# Patient Record
Sex: FEMALE | Race: WHITE | Employment: OTHER | URBAN - NONMETROPOLITAN AREA
[De-identification: names, ages, dates, MRNs, and addresses within clinical notes are randomized per-mention and may not be internally consistent; named-entity substitution may affect disease eponyms.]

---

## 2017-01-20 ENCOUNTER — OFFICE VISIT (OUTPATIENT)
Dept: NEUROSURGERY | Age: 82
End: 2017-01-20
Payer: MEDICARE

## 2017-01-20 VITALS
HEART RATE: 93 BPM | WEIGHT: 135.4 LBS | BODY MASS INDEX: 22.56 KG/M2 | DIASTOLIC BLOOD PRESSURE: 70 MMHG | SYSTOLIC BLOOD PRESSURE: 120 MMHG | HEIGHT: 65 IN

## 2017-01-20 DIAGNOSIS — I67.1 ANEURYSM OF CAVERNOUS PORTION OF RIGHT INTERNAL CAROTID ARTERY: ICD-10-CM

## 2017-01-20 DIAGNOSIS — M54.42 CHRONIC LOW BACK PAIN WITH BILATERAL SCIATICA, UNSPECIFIED BACK PAIN LATERALITY: Primary | ICD-10-CM

## 2017-01-20 DIAGNOSIS — G89.29 CHRONIC LOW BACK PAIN WITH BILATERAL SCIATICA, UNSPECIFIED BACK PAIN LATERALITY: Primary | ICD-10-CM

## 2017-01-20 DIAGNOSIS — M54.41 CHRONIC LOW BACK PAIN WITH BILATERAL SCIATICA, UNSPECIFIED BACK PAIN LATERALITY: Primary | ICD-10-CM

## 2017-01-20 DIAGNOSIS — G50.0 TRIGEMINAL NEURALGIA: ICD-10-CM

## 2017-01-20 PROCEDURE — G8484 FLU IMMUNIZE NO ADMIN: HCPCS | Performed by: PSYCHIATRY & NEUROLOGY

## 2017-01-20 PROCEDURE — G8427 DOCREV CUR MEDS BY ELIG CLIN: HCPCS | Performed by: PSYCHIATRY & NEUROLOGY

## 2017-01-20 PROCEDURE — 4040F PNEUMOC VAC/ADMIN/RCVD: CPT | Performed by: PSYCHIATRY & NEUROLOGY

## 2017-01-20 PROCEDURE — 4004F PT TOBACCO SCREEN RCVD TLK: CPT | Performed by: PSYCHIATRY & NEUROLOGY

## 2017-01-20 PROCEDURE — 1123F ACP DISCUSS/DSCN MKR DOCD: CPT | Performed by: PSYCHIATRY & NEUROLOGY

## 2017-01-20 PROCEDURE — G8400 PT W/DXA NO RESULTS DOC: HCPCS | Performed by: PSYCHIATRY & NEUROLOGY

## 2017-01-20 PROCEDURE — 1090F PRES/ABSN URINE INCON ASSESS: CPT | Performed by: PSYCHIATRY & NEUROLOGY

## 2017-01-20 PROCEDURE — G8419 CALC BMI OUT NRM PARAM NOF/U: HCPCS | Performed by: PSYCHIATRY & NEUROLOGY

## 2017-01-20 PROCEDURE — 99214 OFFICE O/P EST MOD 30 MIN: CPT | Performed by: PSYCHIATRY & NEUROLOGY

## 2017-01-20 RX ORDER — HYDROCODONE BITARTRATE AND ACETAMINOPHEN 7.5; 325 MG/1; MG/1
1 TABLET ORAL EVERY 8 HOURS PRN
Qty: 60 TABLET | Refills: 0 | Status: SHIPPED | OUTPATIENT
Start: 2017-01-20 | End: 2017-07-21 | Stop reason: SDUPTHER

## 2017-01-30 ENCOUNTER — OFFICE VISIT (OUTPATIENT)
Dept: NEUROSURGERY | Facility: CLINIC | Age: 82
End: 2017-01-30

## 2017-01-30 VITALS
BODY MASS INDEX: 22.49 KG/M2 | HEIGHT: 65 IN | DIASTOLIC BLOOD PRESSURE: 60 MMHG | SYSTOLIC BLOOD PRESSURE: 100 MMHG | WEIGHT: 135 LBS

## 2017-01-30 DIAGNOSIS — F17.200 SMOKER: ICD-10-CM

## 2017-01-30 DIAGNOSIS — M47.892 OTHER SPONDYLOSIS, CERVICAL REGION: Primary | ICD-10-CM

## 2017-01-30 DIAGNOSIS — M50.30 DEGENERATION OF CERVICAL INTERVERTEBRAL DISC: ICD-10-CM

## 2017-01-30 PROCEDURE — 99213 OFFICE O/P EST LOW 20 MIN: CPT | Performed by: NEUROLOGICAL SURGERY

## 2017-01-30 RX ORDER — INDAPAMIDE 2.5 MG/1
TABLET, FILM COATED ORAL
COMMUNITY
End: 2019-01-01

## 2017-01-30 RX ORDER — ALBUTEROL SULFATE 90 UG/1
AEROSOL, METERED RESPIRATORY (INHALATION) EVERY 6 HOURS
COMMUNITY

## 2017-01-30 RX ORDER — HYDROCODONE BITARTRATE AND ACETAMINOPHEN 7.5; 325 MG/1; MG/1
TABLET ORAL EVERY 8 HOURS
COMMUNITY
Start: 2017-01-20 | End: 2018-08-24

## 2017-01-30 RX ORDER — ATORVASTATIN CALCIUM 40 MG/1
TABLET, FILM COATED ORAL
COMMUNITY

## 2017-01-30 RX ORDER — CYCLOBENZAPRINE HCL 10 MG
TABLET ORAL 3 TIMES DAILY
COMMUNITY
Start: 2016-08-15 | End: 2018-08-24

## 2017-01-30 RX ORDER — SENNOSIDES 8.6 MG
TABLET ORAL NIGHTLY
COMMUNITY
End: 2018-08-24

## 2017-01-30 RX ORDER — IPRATROPIUM BROMIDE AND ALBUTEROL SULFATE 2.5; .5 MG/3ML; MG/3ML
SOLUTION RESPIRATORY (INHALATION)
COMMUNITY
End: 2019-01-01

## 2017-01-30 NOTE — MR AVS SNAPSHOT
Shirley Bamforth   1/30/2017 12:45 PM   Office Visit    Dept Phone:  [phone]   Encounter #:  [id number]    Provider:  Romain Smyth MD   Department:  [facility] NEUROSURGERY                Your Full Care Plan              Your Updated Medication List          This list is accurate as of: 1/30/17  1:16 PM.  Always use your most recent med list.                * albuterol 108 (90 BASE) MCG/ACT inhaler   Commonly known as:  PROVENTIL HFA;VENTOLIN HFA       * albuterol 108 (90 BASE) MCG/ACT inhaler   Commonly known as:  PROVENTIL HFA;VENTOLIN HFA       * atorvastatin 40 MG tablet   Commonly known as:  LIPITOR       * atorvastatin 40 MG tablet   Commonly known as:  LIPITOR       * cyclobenzaprine 10 MG tablet   Commonly known as:  FLEXERIL       * cyclobenzaprine 10 MG tablet   Commonly known as:  FLEXERIL       HYDROcodone-acetaminophen 7.5-325 MG per tablet   Commonly known as:  NORCO       * indapamide 2.5 MG tablet   Commonly known as:  LOZOL       * indapamide 2.5 MG tablet   Commonly known as:  LOZOL       * ipratropium-albuterol 0.5-2.5 mg/mL nebulizer   Commonly known as:  DUO-NEB       * ipratropium-albuterol 0.5-2.5 mg/mL nebulizer   Commonly known as:  DUO-NEB       LINZESS 145 MCG capsule   Generic drug:  Linaclotide       O2   Commonly known as:  OXYGEN       potassium chloride 10 MEQ CR tablet   Commonly known as:  K-DUR       POTASSIUM PO       * salmeterol 50 MCG/DOSE diskus inhaler   Commonly known as:  SEREVENT       * SEREVENT DISKUS 50 MCG/DOSE diskus inhaler   Generic drug:  salmeterol       senna 8.6 MG tablet tablet   Commonly known as:  SENOKOT       SENNA CO       * Notice:  This list has 12 medication(s) that are the same as other medications prescribed for you. Read the directions carefully, and ask your doctor or other care provider to review them with you.            You Were Diagnosed With        Codes Comments    Other  "spondylosis, cervical region    -  Primary ICD-10-CM: M47.892  ICD-9-CM: 721.0     Degeneration of cervical intervertebral disc     ICD-10-CM: M50.30  ICD-9-CM: 722.4     Smoker     ICD-10-CM: F17.200  ICD-9-CM: 305.1       Instructions     None    Patient Instructions History      Upcoming Appointments     Visit Type Date Time Department    FOLLOW UP 1/30/2017 12:45 PM [facility]      MyChart Signup     Our records indicate that you have declined [facility] MyChart signup. If you would like to sign up for [employer], please email [email or web address] or call [phone] to obtain an activation code.             Other Info from Your Visit           Allergies     Nsaids      Oxycontin [Oxycodone Hcl]      Penicillins      Sulfa Antibiotics      Adhesive Tape  Rash    Baclofen  Rash, Confusion    Soma [Carisoprodol]  Rash      Reason for Visit     Neck Pain Better      Vital Signs     Blood Pressure Height Weight Body Mass Index Smoking Status       100/60 65\" (165.1 cm) 135 lb (61.2 kg) 22.47 kg/m2 Current Every Day Smoker       Problems and Diagnoses Noted     Degeneration of intervertebral disc of cervical region    Arthritis of spine    Smoker            "

## 2017-01-30 NOTE — LETTER
January 30, 2017     TERRANCE Marley  [address] [address]    Patient: Shirley Bamforth   YOB: 1933   Date of Visit: 1/30/2017       Dear TERRANCE Amaya:    Shirley Bamforth was in my office today. Below is a copy of my note.    If you have questions, please do not hesitate to call me. I look forward to following Marii along with you.         Sincerely,        Romain Smyth MD        CC: No Recipients    Patient ID: Shirley Bamforth is a 83 y.o. female here today for [] follow-up for neck pain    HPI:  [] Her neck pain has improved.  The pain had radiated up to the suboccipital area.  No radicular pain into the extremities.  Her children gave her a vibrating device which helped her neck pain.    Leslee had an MRI at [facility] of the cervical spine.  She has disc bulging worse at C3 4 and C45.  She also has had a remote injury to her neck and an accident.  She does not complain of specific weakness or numbness.    The following portions of the patient's history were reviewed and updated as appropriate: allergies, current medications, past family history, past medical history, past social history, past surgical history and problem list.    Review of Systems   Constitutional: Negative.    HENT: Positive for hearing loss.    Eyes: Negative.    Respiratory: Positive for wheezing.    Cardiovascular: Negative.    Gastrointestinal: Positive for abdominal pain and diarrhea.   Endocrine: Negative.    Genitourinary: Negative.         Dribbling   Musculoskeletal: Positive for neck pain.   Skin: Negative.    Allergic/Immunologic: Negative.    Neurological: Negative.    Hematological: Negative.    Psychiatric/Behavioral: Negative.    All other systems reviewed and are negative.      Past Medical History   Diagnosis Date   • Asthma    • Brain aneurysm      Does have endovascular coiling this is MRI compatible   • Cancer, uterine    • COPD (chronic obstructive pulmonary disease)     • Diverticulitis    • Dyspnea    • GERD (gastroesophageal reflux disease)    • Osteoporosis    • Rheumatoid arthritis    • Shingles (herpes zoster) polyneuropathy        Family History   Problem Relation Age of Onset   • Arthritis Mother    • Cancer Mother    • Diabetes Mother    • Hypertension Mother    • Diabetes Brother    • Heart disease Brother    • Hypertension Brother    • Cancer Daughter        Social History   Substance Use Topics   • Smoking status: Current Every Day Smoker     Packs/day: 1.00     Types: Cigarettes   • Smokeless tobacco: Never Used      Comment: Trying to cut down   • Alcohol use No       Past Surgical History   Procedure Laterality Date   • Hysterectomy     • Appendectomy     • Cataract extraction extracapsular w/ intraocular lens implantation Bilateral    • Hernia repair     • Cerebral aneurysm repair     • Colostomy     • Tonsillectomy and adenoidectomy     • Shoulder surgery Right        Nsaids; Oxycontin [oxycodone hcl]; Penicillins; Sulfa antibiotics; Adhesive tape; Baclofen; and Soma [carisoprodol]      Physical Exam:  [] Chest clear heart slightly irregular no murmur heard.  Tenderness about the neck.  Fairly good range of motion especially for her age.  Good equal strength upper and lower extremities.  Diminished reflexes throughout.  No sensory loss.  No Juan Alberto's no clonus    Review of Radiographic Studies:  [] MRI cervical spine reviewed.  Disc degeneration and some relative stenosis.  Chronic changes of the spinal cord as well.    Medical Decision Making:  [] Her symptoms of neck pain are improved.  She does not require any surgical intervention at her age especially    1. Other spondylosis, cervical region    2. Degeneration of cervical intervertebral disc    3. Smoker         Return if symptoms worsen or fail to improve.

## 2017-01-30 NOTE — PROGRESS NOTES
Patient ID: Shirley Bamforth is a 83 y.o. female here today for [] follow-up for neck pain    HPI:  [] Her neck pain has improved.  The pain had radiated up to the suboccipital area.  No radicular pain into the extremities.  Her children gave her a vibrating device which helped her neck pain.    Leslee had an MRI at [facility] of the cervical spine.  She has disc bulging worse at C3 4 and C45.  She also has had a remote injury to her neck and an accident.  She does not complain of specific weakness or numbness.    The following portions of the patient's history were reviewed and updated as appropriate: allergies, current medications, past family history, past medical history, past social history, past surgical history and problem list.    Review of Systems   Constitutional: Negative.    HENT: Positive for hearing loss.    Eyes: Negative.    Respiratory: Positive for wheezing.    Cardiovascular: Negative.    Gastrointestinal: Positive for abdominal pain and diarrhea.   Endocrine: Negative.    Genitourinary: Negative.         Dribbling   Musculoskeletal: Positive for neck pain.   Skin: Negative.    Allergic/Immunologic: Negative.    Neurological: Negative.    Hematological: Negative.    Psychiatric/Behavioral: Negative.    All other systems reviewed and are negative.      Past Medical History   Diagnosis Date   • Asthma    • Brain aneurysm      Does have endovascular coiling this is MRI compatible   • Cancer, uterine    • COPD (chronic obstructive pulmonary disease)    • Diverticulitis    • Dyspnea    • GERD (gastroesophageal reflux disease)    • Osteoporosis    • Rheumatoid arthritis    • Shingles (herpes zoster) polyneuropathy        Family History   Problem Relation Age of Onset   • Arthritis Mother    • Cancer Mother    • Diabetes Mother    • Hypertension Mother    • Diabetes Brother    • Heart disease Brother    • Hypertension Brother    • Cancer Daughter        Social History   Substance Use Topics   • Smoking  status: Current Every Day Smoker     Packs/day: 1.00     Types: Cigarettes   • Smokeless tobacco: Never Used      Comment: Trying to cut down   • Alcohol use No       Past Surgical History   Procedure Laterality Date   • Hysterectomy     • Appendectomy     • Cataract extraction extracapsular w/ intraocular lens implantation Bilateral    • Hernia repair     • Cerebral aneurysm repair     • Colostomy     • Tonsillectomy and adenoidectomy     • Shoulder surgery Right        Nsaids; Oxycontin [oxycodone hcl]; Penicillins; Sulfa antibiotics; Adhesive tape; Baclofen; and Soma [carisoprodol]      Physical Exam:  [] Chest clear heart slightly irregular no murmur heard.  Tenderness about the neck.  Fairly good range of motion especially for her age.  Good equal strength upper and lower extremities.  Diminished reflexes throughout.  No sensory loss.  No Juan Alberto's no clonus    Review of Radiographic Studies:  [] MRI cervical spine reviewed.  Disc degeneration and some relative stenosis.  Chronic changes of the spinal cord as well.    Medical Decision Making:  [] Her symptoms of neck pain are improved.  She does not require any surgical intervention at her age especially    1. Other spondylosis, cervical region    2. Degeneration of cervical intervertebral disc    3. Smoker         Return if symptoms worsen or fail to improve.

## 2017-06-29 ENCOUNTER — TELEPHONE (OUTPATIENT)
Dept: NEUROSURGERY | Facility: CLINIC | Age: 82
End: 2017-06-29

## 2017-06-29 ENCOUNTER — TELEPHONE (OUTPATIENT)
Dept: NEUROLOGY | Age: 82
End: 2017-06-29

## 2017-06-29 DIAGNOSIS — M47.22 OSTEOARTHRITIS OF SPINE WITH RADICULOPATHY, CERVICAL REGION: Primary | ICD-10-CM

## 2017-06-29 DIAGNOSIS — M50.30 DEGENERATION OF CERVICAL INTERVERTEBRAL DISC: ICD-10-CM

## 2017-06-29 NOTE — TELEPHONE ENCOUNTER
Patient calls requesting referral to pain management. She complains that her neck pain has worsened over the past few weeks. I have explained that due to not seeing her recently they may not accept any referrals without recent exam, but I would try to refer. She understands if no one will accept our referral that she needs to see her PCP for referral. She is agreeable.

## 2017-07-21 ENCOUNTER — OFFICE VISIT (OUTPATIENT)
Dept: NEUROSURGERY | Age: 82
End: 2017-07-21
Payer: MEDICARE

## 2017-07-21 VITALS
HEART RATE: 80 BPM | HEIGHT: 65 IN | BODY MASS INDEX: 21.99 KG/M2 | WEIGHT: 132 LBS | DIASTOLIC BLOOD PRESSURE: 76 MMHG | SYSTOLIC BLOOD PRESSURE: 131 MMHG | OXYGEN SATURATION: 95 %

## 2017-07-21 DIAGNOSIS — M54.42 CHRONIC LOW BACK PAIN WITH BILATERAL SCIATICA, UNSPECIFIED BACK PAIN LATERALITY: Primary | ICD-10-CM

## 2017-07-21 DIAGNOSIS — G89.29 CHRONIC LOW BACK PAIN WITH BILATERAL SCIATICA, UNSPECIFIED BACK PAIN LATERALITY: Primary | ICD-10-CM

## 2017-07-21 DIAGNOSIS — I67.1 ANEURYSM OF CAVERNOUS PORTION OF RIGHT INTERNAL CAROTID ARTERY: ICD-10-CM

## 2017-07-21 DIAGNOSIS — M54.41 CHRONIC LOW BACK PAIN WITH BILATERAL SCIATICA, UNSPECIFIED BACK PAIN LATERALITY: Primary | ICD-10-CM

## 2017-07-21 DIAGNOSIS — G50.0 TRIGEMINAL NEURALGIA: ICD-10-CM

## 2017-07-21 PROCEDURE — 4040F PNEUMOC VAC/ADMIN/RCVD: CPT | Performed by: PSYCHIATRY & NEUROLOGY

## 2017-07-21 PROCEDURE — G8420 CALC BMI NORM PARAMETERS: HCPCS | Performed by: PSYCHIATRY & NEUROLOGY

## 2017-07-21 PROCEDURE — G8427 DOCREV CUR MEDS BY ELIG CLIN: HCPCS | Performed by: PSYCHIATRY & NEUROLOGY

## 2017-07-21 PROCEDURE — 99214 OFFICE O/P EST MOD 30 MIN: CPT | Performed by: PSYCHIATRY & NEUROLOGY

## 2017-07-21 PROCEDURE — 4004F PT TOBACCO SCREEN RCVD TLK: CPT | Performed by: PSYCHIATRY & NEUROLOGY

## 2017-07-21 PROCEDURE — G8400 PT W/DXA NO RESULTS DOC: HCPCS | Performed by: PSYCHIATRY & NEUROLOGY

## 2017-07-21 PROCEDURE — 1123F ACP DISCUSS/DSCN MKR DOCD: CPT | Performed by: PSYCHIATRY & NEUROLOGY

## 2017-07-21 PROCEDURE — 1090F PRES/ABSN URINE INCON ASSESS: CPT | Performed by: PSYCHIATRY & NEUROLOGY

## 2017-07-21 RX ORDER — MELOXICAM 15 MG/1
15 TABLET ORAL DAILY
COMMUNITY
End: 2017-09-27

## 2017-07-21 RX ORDER — HYDROCODONE BITARTRATE AND ACETAMINOPHEN 7.5; 325 MG/1; MG/1
1 TABLET ORAL EVERY 8 HOURS PRN
Qty: 60 TABLET | Refills: 0 | Status: SHIPPED | OUTPATIENT
Start: 2017-07-21 | End: 2017-09-27 | Stop reason: SDUPTHER

## 2017-07-21 RX ORDER — CYCLOBENZAPRINE HCL 10 MG
10 TABLET ORAL 3 TIMES DAILY PRN
Qty: 60 TABLET | Refills: 11 | Status: SHIPPED | OUTPATIENT
Start: 2017-07-21 | End: 2018-07-24 | Stop reason: ALTCHOICE

## 2017-07-21 RX ORDER — FAMOTIDINE 20 MG/1
20 TABLET, FILM COATED ORAL 2 TIMES DAILY
COMMUNITY
End: 2017-11-13 | Stop reason: ALTCHOICE

## 2017-07-26 ENCOUNTER — HOSPITAL ENCOUNTER (OUTPATIENT)
Dept: GENERAL RADIOLOGY | Age: 82
Discharge: HOME | End: 2017-07-26
Payer: MEDICARE

## 2017-07-26 ENCOUNTER — HOSPITAL ENCOUNTER (OUTPATIENT)
Dept: PAIN MANAGEMENT | Age: 82
Discharge: HOME | End: 2017-07-26
Payer: MEDICARE

## 2017-07-26 VITALS
OXYGEN SATURATION: 96 % | SYSTOLIC BLOOD PRESSURE: 109 MMHG | BODY MASS INDEX: 21.38 KG/M2 | RESPIRATION RATE: 20 BRPM | TEMPERATURE: 97 F | HEART RATE: 73 BPM | HEIGHT: 66 IN | WEIGHT: 133 LBS | DIASTOLIC BLOOD PRESSURE: 67 MMHG

## 2017-07-26 DIAGNOSIS — M54.2 CERVICAL PAIN (NECK): Primary | ICD-10-CM

## 2017-07-26 DIAGNOSIS — M54.2 CERVICAL PAIN (NECK): ICD-10-CM

## 2017-07-26 PROCEDURE — 99214 OFFICE O/P EST MOD 30 MIN: CPT

## 2017-07-26 PROCEDURE — 72040 X-RAY EXAM NECK SPINE 2-3 VW: CPT

## 2017-07-26 ASSESSMENT — PAIN SCALES - GENERAL: PAINLEVEL_OUTOF10: 4

## 2017-07-26 ASSESSMENT — PAIN DESCRIPTION - PROGRESSION: CLINICAL_PROGRESSION: GRADUALLY WORSENING

## 2017-07-26 ASSESSMENT — PAIN DESCRIPTION - PAIN TYPE: TYPE: CHRONIC PAIN

## 2017-07-26 ASSESSMENT — PAIN DESCRIPTION - DIRECTION: RADIATING_TOWARDS: RADIATES UP INTO HEAD

## 2017-07-26 ASSESSMENT — PAIN DESCRIPTION - ORIENTATION: ORIENTATION: LOWER

## 2017-07-26 ASSESSMENT — ACTIVITIES OF DAILY LIVING (ADL): EFFECT OF PAIN ON DAILY ACTIVITIES: LIMITS ACTIVITIES

## 2017-07-26 ASSESSMENT — PAIN DESCRIPTION - FREQUENCY: FREQUENCY: INTERMITTENT

## 2017-07-26 ASSESSMENT — PAIN DESCRIPTION - LOCATION: LOCATION: NECK;HEAD

## 2017-07-26 ASSESSMENT — PAIN DESCRIPTION - ONSET: ONSET: ON-GOING

## 2017-07-26 ASSESSMENT — PAIN DESCRIPTION - DESCRIPTORS: DESCRIPTORS: CONSTANT;SHARP;ACHING

## 2017-08-23 ENCOUNTER — HOSPITAL ENCOUNTER (OUTPATIENT)
Dept: PAIN MANAGEMENT | Age: 82
Discharge: HOME | End: 2017-08-23
Payer: MEDICARE

## 2017-08-23 VITALS
SYSTOLIC BLOOD PRESSURE: 147 MMHG | OXYGEN SATURATION: 96 % | BODY MASS INDEX: 21.38 KG/M2 | HEIGHT: 66 IN | WEIGHT: 133 LBS | DIASTOLIC BLOOD PRESSURE: 65 MMHG | RESPIRATION RATE: 20 BRPM | TEMPERATURE: 97.4 F | HEART RATE: 80 BPM

## 2017-08-23 PROCEDURE — 99214 OFFICE O/P EST MOD 30 MIN: CPT

## 2017-08-23 RX ORDER — PREGABALIN 75 MG/1
75 CAPSULE ORAL DAILY
Qty: 60 CAPSULE | Refills: 2 | Status: SHIPPED | OUTPATIENT
Start: 2017-08-23 | End: 2017-09-27

## 2017-08-23 ASSESSMENT — PAIN DESCRIPTION - LOCATION: LOCATION: NECK

## 2017-08-23 ASSESSMENT — PAIN DESCRIPTION - ONSET: ONSET: ON-GOING

## 2017-08-23 ASSESSMENT — ACTIVITIES OF DAILY LIVING (ADL): EFFECT OF PAIN ON DAILY ACTIVITIES: DAILY CHORES AND ACTIVITIES

## 2017-08-23 ASSESSMENT — PAIN DESCRIPTION - FREQUENCY: FREQUENCY: INTERMITTENT

## 2017-08-23 ASSESSMENT — PAIN SCALES - GENERAL: PAINLEVEL_OUTOF10: 4

## 2017-08-23 ASSESSMENT — PAIN DESCRIPTION - DESCRIPTORS: DESCRIPTORS: ACHING;THROBBING

## 2017-08-23 ASSESSMENT — PAIN DESCRIPTION - PAIN TYPE: TYPE: CHRONIC PAIN

## 2017-08-23 ASSESSMENT — PAIN DESCRIPTION - PROGRESSION: CLINICAL_PROGRESSION: GRADUALLY WORSENING

## 2017-09-27 ENCOUNTER — HOSPITAL ENCOUNTER (OUTPATIENT)
Dept: PAIN MANAGEMENT | Age: 82
Discharge: HOME | End: 2017-09-27
Payer: MEDICARE

## 2017-09-27 VITALS
DIASTOLIC BLOOD PRESSURE: 50 MMHG | OXYGEN SATURATION: 98 % | RESPIRATION RATE: 20 BRPM | WEIGHT: 129 LBS | BODY MASS INDEX: 20.73 KG/M2 | TEMPERATURE: 97.4 F | HEART RATE: 73 BPM | HEIGHT: 66 IN | SYSTOLIC BLOOD PRESSURE: 118 MMHG

## 2017-09-27 DIAGNOSIS — M54.2 NECK PAIN: ICD-10-CM

## 2017-09-27 DIAGNOSIS — M50.10 CERVICAL DISC DISORDER WITH RADICULOPATHY: Chronic | ICD-10-CM

## 2017-09-27 PROCEDURE — 62321 NJX INTERLAMINAR CRV/THRC: CPT

## 2017-09-27 PROCEDURE — 2580000003 HC RX 258

## 2017-09-27 PROCEDURE — 3209999900 FLUORO FOR SURGICAL PROCEDURES

## 2017-09-27 PROCEDURE — 6360000002 HC RX W HCPCS

## 2017-09-27 PROCEDURE — 2500000003 HC RX 250 WO HCPCS

## 2017-09-27 RX ORDER — BUPIVACAINE HYDROCHLORIDE 2.5 MG/ML
INJECTION, SOLUTION EPIDURAL; INFILTRATION; INTRACAUDAL
Status: COMPLETED | OUTPATIENT
Start: 2017-09-27 | End: 2017-09-27

## 2017-09-27 RX ORDER — 0.9 % SODIUM CHLORIDE 0.9 %
VIAL (ML) INJECTION
Status: COMPLETED | OUTPATIENT
Start: 2017-09-27 | End: 2017-09-27

## 2017-09-27 RX ORDER — HYDROCODONE BITARTRATE AND ACETAMINOPHEN 7.5; 325 MG/1; MG/1
1 TABLET ORAL EVERY 8 HOURS PRN
Qty: 60 TABLET | Refills: 0 | Status: SHIPPED | OUTPATIENT
Start: 2017-09-27 | End: 2018-05-03 | Stop reason: SDUPTHER

## 2017-09-27 RX ORDER — LIDOCAINE HYDROCHLORIDE 10 MG/ML
INJECTION, SOLUTION EPIDURAL; INFILTRATION; INTRACAUDAL; PERINEURAL
Status: COMPLETED | OUTPATIENT
Start: 2017-09-27 | End: 2017-09-27

## 2017-09-27 RX ADMIN — BUPIVACAINE HYDROCHLORIDE 2.5 ML: 2.5 INJECTION, SOLUTION EPIDURAL; INFILTRATION; INTRACAUDAL at 11:20

## 2017-09-27 RX ADMIN — Medication 1.5 ML: at 11:20

## 2017-09-27 RX ADMIN — LIDOCAINE HYDROCHLORIDE 3 ML: 10 INJECTION, SOLUTION EPIDURAL; INFILTRATION; INTRACAUDAL; PERINEURAL at 11:18

## 2017-09-27 ASSESSMENT — PAIN - FUNCTIONAL ASSESSMENT: PAIN_FUNCTIONAL_ASSESSMENT: 0-10

## 2017-09-27 ASSESSMENT — PAIN DESCRIPTION - DESCRIPTORS: DESCRIPTORS: ACHING;THROBBING;RADIATING

## 2017-09-27 ASSESSMENT — ACTIVITIES OF DAILY LIVING (ADL): EFFECT OF PAIN ON DAILY ACTIVITIES: DAILY CHORES AND ACTIVITIES

## 2017-11-13 ENCOUNTER — OFFICE VISIT (OUTPATIENT)
Dept: CARDIOLOGY | Age: 82
End: 2017-11-13
Payer: MEDICARE

## 2017-11-13 VITALS
SYSTOLIC BLOOD PRESSURE: 118 MMHG | BODY MASS INDEX: 21.83 KG/M2 | HEART RATE: 88 BPM | HEIGHT: 65 IN | WEIGHT: 131 LBS | DIASTOLIC BLOOD PRESSURE: 60 MMHG

## 2017-11-13 DIAGNOSIS — I49.9 IRREGULAR HEARTBEAT: ICD-10-CM

## 2017-11-13 DIAGNOSIS — R94.31 ABNORMAL EKG: ICD-10-CM

## 2017-11-13 DIAGNOSIS — R00.2 PALPITATIONS: ICD-10-CM

## 2017-11-13 DIAGNOSIS — Z72.0 TOBACCO ABUSE: ICD-10-CM

## 2017-11-13 DIAGNOSIS — I49.9 IRREGULAR HEART RATE: Primary | ICD-10-CM

## 2017-11-13 DIAGNOSIS — Z01.818 PRE-OP EVALUATION: ICD-10-CM

## 2017-11-13 DIAGNOSIS — E78.2 MIXED HYPERLIPIDEMIA: ICD-10-CM

## 2017-11-13 DIAGNOSIS — J43.8 OTHER EMPHYSEMA (HCC): ICD-10-CM

## 2017-11-13 LAB
ANION GAP SERPL CALCULATED.3IONS-SCNC: 14 MMOL/L (ref 7–19)
BUN BLDV-MCNC: 23 MG/DL (ref 8–23)
CALCIUM SERPL-MCNC: 9.3 MG/DL (ref 8.8–10.2)
CHLORIDE BLD-SCNC: 102 MMOL/L (ref 98–111)
CO2: 29 MMOL/L (ref 22–29)
CREAT SERPL-MCNC: 0.9 MG/DL (ref 0.5–0.9)
GFR NON-AFRICAN AMERICAN: 60
GLUCOSE BLD-MCNC: 96 MG/DL (ref 74–109)
POTASSIUM SERPL-SCNC: 3.5 MMOL/L (ref 3.5–5)
SODIUM BLD-SCNC: 145 MMOL/L (ref 136–145)
TSH SERPL DL<=0.05 MIU/L-ACNC: 1.51 UIU/ML (ref 0.27–4.2)

## 2017-11-13 PROCEDURE — G8484 FLU IMMUNIZE NO ADMIN: HCPCS | Performed by: CLINICAL NURSE SPECIALIST

## 2017-11-13 PROCEDURE — 99204 OFFICE O/P NEW MOD 45 MIN: CPT | Performed by: CLINICAL NURSE SPECIALIST

## 2017-11-13 PROCEDURE — G8926 SPIRO NO PERF OR DOC: HCPCS | Performed by: CLINICAL NURSE SPECIALIST

## 2017-11-13 PROCEDURE — G8427 DOCREV CUR MEDS BY ELIG CLIN: HCPCS | Performed by: CLINICAL NURSE SPECIALIST

## 2017-11-13 PROCEDURE — 93000 ELECTROCARDIOGRAM COMPLETE: CPT | Performed by: CLINICAL NURSE SPECIALIST

## 2017-11-13 PROCEDURE — 1090F PRES/ABSN URINE INCON ASSESS: CPT | Performed by: CLINICAL NURSE SPECIALIST

## 2017-11-13 PROCEDURE — G8400 PT W/DXA NO RESULTS DOC: HCPCS | Performed by: CLINICAL NURSE SPECIALIST

## 2017-11-13 PROCEDURE — 4040F PNEUMOC VAC/ADMIN/RCVD: CPT | Performed by: CLINICAL NURSE SPECIALIST

## 2017-11-13 PROCEDURE — 1123F ACP DISCUSS/DSCN MKR DOCD: CPT | Performed by: CLINICAL NURSE SPECIALIST

## 2017-11-13 PROCEDURE — G8420 CALC BMI NORM PARAMETERS: HCPCS | Performed by: CLINICAL NURSE SPECIALIST

## 2017-11-13 PROCEDURE — 4004F PT TOBACCO SCREEN RCVD TLK: CPT | Performed by: CLINICAL NURSE SPECIALIST

## 2017-11-13 PROCEDURE — 3023F SPIROM DOC REV: CPT | Performed by: CLINICAL NURSE SPECIALIST

## 2017-11-13 RX ORDER — OMEPRAZOLE 20 MG/1
20 CAPSULE, DELAYED RELEASE ORAL 2 TIMES DAILY
COMMUNITY
End: 2019-08-20 | Stop reason: ALTCHOICE

## 2017-11-13 NOTE — PROGRESS NOTES
bag    Hiatal hernia     Irregular heartbeat     Irritable bowel syndrome     Mixed hyperlipidemia 11/14/2017    Osteoporosis     Rheumatoid arthritis(714.0)     Scoliosis     Shingles     Spinal stenosis     Spondylisthesis     L5-6    Tobacco abuse 11/14/2017    Ulcer ([address]     Uterine cancer ([facility] [address]      Past Surgical History:   Procedure Laterality Date    ABDOMINAL ADHESION SURGERY      APPENDECTOMY      CHOLECYSTECTOMY      COLON SURGERY      colostomy    ELBOW SURGERY      RIGHT    HAND SURGERY      RIGHT    HERNIA REPAIR      HYSTERECTOMY      SHOULDER SURGERY      RIGHT    SKIN CANCER EXCISION  1962    TONSILLECTOMY AND ADENOIDECTOMY      TUBAL LIGATION      TYMPANOSTOMY TUBE PLACEMENT      LEFT     Family History   Problem Relation Age of Onset    Diabetes Mother     Heart Disease Mother     Cancer Mother     Other Mother     Heart Disease Brother      2    Stroke Brother     Diabetes Brother     Cancer Brother      Social History   Substance Use Topics    Smoking status: Current Every Day Smoker     Packs/day: 0.25     Years: 51.00     Types: Cigarettes    Smokeless tobacco: Never Used      Comment: down to 5 cigs a day    Alcohol use Not on file      Current Outpatient Prescriptions   Medication Sig Dispense Refill    omeprazole (PRILOSEC) 20 MG delayed release capsule Take 20 mg by mouth 2 times daily      HYDROcodone-acetaminophen (NORCO) 7.5-325 MG per tablet Take 1 tablet by mouth every 8 hours as needed for Pain .  Earliest Fill Date: 9/20/17 60 tablet 0    cyclobenzaprine (FLEXERIL) 10 MG tablet Take 1 tablet by mouth 3 times daily as needed for Muscle spasms 60 tablet 11    linaclotide (LINZESS) 145 MCG capsule Take 145 mcg by mouth every morning (before breakfast)      ipratropium-albuterol (DUONEB) 0.5-2.5 (3) MG/3ML SOLN nebulizer solution Inhale 1 vial into the lungs 4 times daily      Oxygen Concentrator Indications: uses 8-10 hrs nightly      albuterol (PROVENTIL HFA;VENTOLIN HFA) 108 (90 BASE) MCG/ACT inhaler Inhale 2 puffs into the lungs every 6 hours as needed.  potassium chloride (KLOR-CON) 10 MEQ CR tablet Take 10 mEq by mouth 2 times daily.  aspirin 325 MG EC tablet Take 325 mg by mouth daily.  salmeterol (SEREVENT DISKUS) 50 MCG/DOSE diskus inhaler Inhale 1 puff into the lungs 2 times daily.  indapamide (LOZOL) 2.5 MG tablet Take 2.5 mg by mouth every morning.  atorvastatin (LIPITOR) 40 MG tablet Take 40 mg by mouth daily. No current facility-administered medications for this visit. Allergies: Baclofen; Ibuprofen; Medrol [methylprednisolone]; Neosporin [neomycin-bacitracin zn-polymyx]; Nsaids; Other; Oxycontin [oxycodone hcl]; Pcn [penicillins]; Soma [carisoprodol]; Sulfa antibiotics; and Tape [adhesive tape]    Review of Systems  Review of Systems   Constitutional: Negative for chills, fever and malaise/fatigue. HENT: Negative for congestion. Eyes: Negative for blurred vision. Respiratory: Positive for shortness of breath (mild, chronic). Negative for cough. Cardiovascular: Positive for palpitations (daily). Negative for chest pain, orthopnea, leg swelling and PND. Gastrointestinal: Negative for blood in stool, heartburn, nausea and vomiting. Musculoskeletal: Positive for joint pain (left shoulder). Negative for falls and myalgias. Skin: Negative for rash. Neurological: Negative for dizziness, sensory change, speech change and focal weakness. Endo/Heme/Allergies: Does not bruise/bleed easily. Psychiatric/Behavioral: Negative for depression. The patient is not nervous/anxious. Objective  Vital Signs - /60   Pulse 88   Ht 5' 5\" (1.651 m)   Wt 131 lb (59.4 kg)   BMI 21.80 kg/m²   Physical Exam   Constitutional: She is oriented to person, place, and time. She appears well-developed and well-nourished. No distress. HENT:   Head: Normocephalic and atraumatic. Eyes: Pupils are equal, round, and reactive to light. Right eye exhibits no discharge. Left eye exhibits no discharge. Neck: No JVD present. No tracheal deviation present. Cardiovascular: Normal rate, normal heart sounds and intact distal pulses. Exam reveals no gallop and no friction rub. No murmur heard. No carotid bruit  Irregular heart rate   Pulmonary/Chest: Effort normal and breath sounds normal. No respiratory distress. She has no wheezes. She has no rales. Abdominal: Soft. There is no tenderness. Musculoskeletal: She exhibits edema (trace lower extremities). Limited mobility of left arm and shoulder   Neurological: She is alert and oriented to person, place, and time. No cranial nerve deficit. Skin: Skin is warm and dry. No rash noted. Psychiatric: She has a normal mood and affect. Her behavior is normal. Judgment normal.   Nursing note and vitals reviewed. Assessment:    1. Irregular heart rate  ECHO Complete 2D W Doppler W Color   2. Palpitations  ECHO Complete 2D W Doppler W Color    ECHO Pharmacological Stress Test    Holter Monitor 48 Hour    TSH without Reflex    Basic Metabolic Panel    Cardiac event monitor   3. Pre-op evaluation  ECHO Pharmacological Stress Test   4. Other emphysema ([address]     5. Tobacco abuse     6. Mixed hyperlipidemia       Patient is taking medications as prescribed  EKG Reviewed with Dr. Rik Quiñonez as well as EKG sent from PCP. Although the print out states atrial fibrillation, it is likely sinus rhythm with frequent PACs    Patient with an abnormal EKG and palpitations in need of a preoperative evaluation for an upcoming shoulder surgery. Patient seen and examined by Dr. Rik Quiñonez as well as myself.  Plan will be for a dobutamine stress echo, 2-D echocardiogram, Holter monitor, TSH, and BMP    Plan    Orders Placed This Encounter   Procedures    TSH without Reflex     Standing Status:   Future     Number of Occurrences:   1     Standing Expiration Date:

## 2017-11-13 NOTE — PATIENT INSTRUCTIONS
Followup With Dr. Dyan Moreno Dec 4  Labs  Echo  Dobutamine Stress Echo  Holter Monitor 48 hrs    [place] at the [facility] and [address] [facility] located on the first floor of [place] [address] through [facility] main entrance and turn immediately to your left. Date/Time:     Patient's contact number:  [phone] (home)     Echocardiogram -  No prep. Takes approximately 30 min. An echocardiogram uses sound waves to produce images of your heart. This commonly used test allows your doctor to see how your heart is beating and pumping blood. Your doctor can use the images from an echocardiogram to identify various abnormalities in the heart muscle and valves. This test has 2 parts:   Ø You will be asked to disrobe from the waist up and given a gown to wear. The technologist will then hook up an EKG monitor to you for the entire exam.   Ø You will then have an ultrasound of your heart (echocardiogram) to assess the heart muscle, heart valves and heart function. You may eat and take any medicines before the exam.     If you need to change your appointment, please call outpatient scheduling at [phone]. [place] at the [facility] and [address] [facility] located on the first floor of [place] [address] through [facility] main entrance and turn immediately to your left. Patient's contact number:  [phone] (home)     Date/Time:     Dobutamine Stress Test    A chemical stress test uses chemical agents injected into the body through the vein. These chemicals make the heart function as if it were under stress. A chemical stress test is used when a traditional stress test (called a cardiac stress test) cannot be done. Testing will take approximately one hour. Dobutamine Stress Echocardiogram Instructions:   No caffeine 24 hours prior to the testing.   This includes: coffee, pop/soda, chocolate, cold medications, etc.  Any product that might contain is beating fast, it may be hard to count your pulse.)  ¨ What you were doing when the palpitations started. ¨ How long the palpitations lasted. ¨ Any other symptoms. · If an activity causes palpitations, slow down or stop. Talk to your doctor before you do that activity again. · Take your medicines exactly as prescribed. Call your doctor if you think you are having a problem with your medicine. When should you call for help? Call 911 anytime you think you may need emergency care. For example, call if:  · You passed out (lost consciousness). · You have symptoms of a heart attack. These may include:  ¨ Chest pain or pressure, or a strange feeling in the chest.  ¨ Sweating. ¨ Shortness of breath. ¨ Pain, pressure, or a strange feeling in the back, neck, jaw, or upper belly or in one or both shoulders or arms. ¨ Lightheadedness or sudden weakness. ¨ A fast or irregular heartbeat. After you call 911, the  may tell you to chew 1 adult-strength or 2 to 4 low-dose aspirin. Wait for an ambulance. Do not try to drive yourself. · You have symptoms of a stroke. These may include:  ¨ Sudden numbness, tingling, weakness, or loss of movement in your face, arm, or leg, especially on only one side of your body. ¨ Sudden vision changes. ¨ Sudden trouble speaking. ¨ Sudden confusion or trouble understanding simple statements. ¨ Sudden problems with walking or balance. ¨ A sudden, severe headache that is different from past headaches. Call your doctor now or seek immediate medical care if:  · You have heart palpitations and:  ¨ Are dizzy or lightheaded, or you feel like you may faint. ¨ Have new or increased shortness of breath. Watch closely for changes in your health, and be sure to contact your doctor if:  · You continue to have heart palpitations. Where can you learn more? Go to [email or web address] and sign in to your [employer] account.  Enter [id number] in the [place] box

## 2017-11-14 ENCOUNTER — TELEPHONE (OUTPATIENT)
Dept: CARDIOLOGY | Age: 82
End: 2017-11-14

## 2017-11-14 PROBLEM — Z72.0 TOBACCO ABUSE: Status: ACTIVE | Noted: 2017-11-14

## 2017-11-14 PROBLEM — E78.2 MIXED HYPERLIPIDEMIA: Status: ACTIVE | Noted: 2017-11-14

## 2017-11-14 PROBLEM — R94.31 ABNORMAL EKG: Status: ACTIVE | Noted: 2017-11-14

## 2017-11-14 ASSESSMENT — ENCOUNTER SYMPTOMS
BLURRED VISION: 0
VOMITING: 0
COUGH: 0
BLOOD IN STOOL: 0
HEARTBURN: 0
ORTHOPNEA: 0
SHORTNESS OF BREATH: 1
NAUSEA: 0

## 2017-11-16 ENCOUNTER — HOSPITAL ENCOUNTER (OUTPATIENT)
Dept: PAIN MANAGEMENT | Age: 82
Discharge: HOME | End: 2017-11-16
Payer: MEDICARE

## 2017-11-16 VITALS
BODY MASS INDEX: 21.83 KG/M2 | SYSTOLIC BLOOD PRESSURE: 132 MMHG | TEMPERATURE: 97 F | WEIGHT: 131 LBS | HEART RATE: 96 BPM | HEIGHT: 65 IN | DIASTOLIC BLOOD PRESSURE: 81 MMHG | OXYGEN SATURATION: 97 % | RESPIRATION RATE: 18 BRPM

## 2017-11-16 DIAGNOSIS — M50.10 CERVICAL DISC DISORDER WITH RADICULOPATHY: ICD-10-CM

## 2017-11-16 PROCEDURE — 99213 OFFICE O/P EST LOW 20 MIN: CPT

## 2017-11-16 PROCEDURE — 80307 DRUG TEST PRSMV CHEM ANLYZR: CPT

## 2017-11-16 ASSESSMENT — PAIN DESCRIPTION - DIRECTION: RADIATING_TOWARDS: DOES NOT RADIATE AT THIS TIME

## 2017-11-16 ASSESSMENT — PAIN DESCRIPTION - ORIENTATION: ORIENTATION: LEFT

## 2017-11-16 ASSESSMENT — PAIN DESCRIPTION - DESCRIPTORS: DESCRIPTORS: ACHING

## 2017-11-16 ASSESSMENT — PAIN DESCRIPTION - PROGRESSION: CLINICAL_PROGRESSION: GRADUALLY WORSENING

## 2017-11-16 ASSESSMENT — PAIN DESCRIPTION - PAIN TYPE: TYPE: CHRONIC PAIN

## 2017-11-16 ASSESSMENT — PAIN DESCRIPTION - FREQUENCY: FREQUENCY: INTERMITTENT

## 2017-11-16 ASSESSMENT — ACTIVITIES OF DAILY LIVING (ADL): EFFECT OF PAIN ON DAILY ACTIVITIES: LIMITS ACTIVITIES

## 2017-11-16 ASSESSMENT — PAIN SCALES - GENERAL: PAINLEVEL_OUTOF10: 1

## 2017-11-16 ASSESSMENT — PAIN DESCRIPTION - ONSET: ONSET: ON-GOING

## 2017-11-16 ASSESSMENT — PAIN DESCRIPTION - LOCATION: LOCATION: NECK;KNEE;SHOULDER

## 2017-11-16 NOTE — PROGRESS NOTES
Nursing Admission Record    Current Issues / Falls / ER Visits:  Yes Patient having tests ran on heart and wearing cardiac monitor due abnormal EKG's. Dr. Dyan Moreno following with cardiology and will be having ECHO and stress test soon to get cleared for left shoulder surgery on 12/7/17 with Dr. Laura Pham in [place], [place]. Percentage of Pain Relief after Last Procedure:  100 %    How long lasted:  1.5 months    Radiology exams received during the last 12 months: Yes Cervical spine xray       When July 2017                                              Where [facility]       Imaging on chart: Yes         Imaging records requested: No  MRI exams received in the past 2 years:  No  Physical therapy during the last 6 months: No       When: na                                             Where  na  Labs during the last 12 months: Yes    Education Provided:  [x] Review of [facility] [place]  [] Agreement Review  [] Compliance Issues Discussed    [] Cognitive Behavior Needs [x] Exercise [] Review of Test [] Financial Issues  [x] Tobacco/Alcohol Use [x] Teaching [] New Patient [] Picture Obtained    Physician Plan:  [] Outgoing Referral  [] Pharmacy Consult  [] Test Ordered   [] Obtained Test Results / Consult Notes  [] UDS due at next visit, verified per EPIC      [] Suspected Physical Abuse or Suicide Risk assessed - IF YES COMPLETE QUESTIONS BELOW    If any of the following questions are answered yes - contact attending physician for referral:    Has been considering harming self to escape stress, pain problems? [] YES  [] NO  Has a suicide plan? [] YES  [] NO  Has attempted suicide in the past?   [] YES  [] NO  Has a close friend or family member who committed suicide? [] YES  [] NO    Patient Referred To :      Additional Notes:    Assessment Completed by:  Electronically signed by Argentina Pierre RN on 11/16/2017 at 9:16 AM
July 2017                                             Where: [facility] on chart: Yes    MRI exams received in the past 2 years:  No  Physical therapy during the last 6 months: No     BMI: Body mass index is 21.8 kg/m².  WNL    Activity:   [x] Exercise discussed as beneficial to pain reduction, encouraged stretching exercises and to set daily goals    Tobacco use:    [x] Cessation discussed      Social History     Social History    Marital status: Single     Spouse name: N/A    Number of children: N/A    Years of education: N/A     Social History Main Topics    Smoking status: Current Every Day Smoker     Packs/day: 0.25     Years: 51.00     Types: Cigarettes    Smokeless tobacco: Never Used      Comment: down to 5 cigs a day    Alcohol use Not on file    Drug use: No    Sexual activity: Not on file     Other Topics Concern    Not on file     Social History Narrative    No narrative on file                                                            Past Medical History:       Diagnosis Date    Abnormal EKG 11/14/2017    Angina     Asthma     Atherosclerosis     Brain aneurysm     with a coil    Bulging disc     Chest pain     COPD (chronic obstructive pulmonary disease) (HCC)     Diverticulitis     GERD (gastroesophageal reflux disease)     has colostomy bag    Hiatal hernia     Irregular heartbeat     Irritable bowel syndrome     Mixed hyperlipidemia 11/14/2017    Osteoporosis     Rheumatoid arthritis(714.0)     Scoliosis     Shingles     Spinal stenosis     Spondylisthesis     L5-6    Tobacco abuse 11/14/2017    Ulcer ([address]     Uterine cancer ([address]      Surgical History:  Past Surgical History:   Procedure Laterality Date    ABDOMINAL ADHESION SURGERY      APPENDECTOMY      CHOLECYSTECTOMY      COLON SURGERY      colostomy    ELBOW SURGERY      RIGHT    HAND SURGERY      RIGHT    HERNIA REPAIR      HYSTERECTOMY      SHOULDER SURGERY      RIGHT    SKIN CANCER EXCISION

## 2017-11-17 LAB
AMPHETAMINES, URINE: NEGATIVE NG/ML
BARBITURATES, URINE: NEGATIVE NG/ML
BENZODIAZEPINES, URINE: NEGATIVE NG/ML
CANNABINOIDS, URINE: NEGATIVE NG/ML
COCAINE METABOLITE, URINE: NEGATIVE NG/ML
CREATININE, URINE: 64.8 MG/DL (ref 20–300)
ETHANOL U, QUAN: NEGATIVE %
FENTANYL URINE: NEGATIVE PG/ML
MEPERIDINE, UR: NEGATIVE NG/ML
METHADONE SCREEN, URINE: NEGATIVE NG/ML
OPIATES, URINE: NEGATIVE NG/ML
OXYCODONE/OXYMORPHONE, UR: NEGATIVE NG/ML
PH, URINE: 5.6 (ref 4.5–8.9)
PHENCYCLIDINE, URINE: NEGATIVE NG/ML
PROPOXYPHENE, URINE: NEGATIVE NG/ML

## 2017-11-28 ENCOUNTER — TELEPHONE (OUTPATIENT)
Dept: CARDIOLOGY | Age: 82
End: 2017-11-28

## 2017-11-29 ENCOUNTER — HOSPITAL ENCOUNTER (OUTPATIENT)
Dept: NON INVASIVE DIAGNOSTICS | Age: 82
Discharge: HOME | End: 2017-11-29
Payer: MEDICARE

## 2017-11-29 VITALS
HEART RATE: 78 BPM | WEIGHT: 125.66 LBS | SYSTOLIC BLOOD PRESSURE: 126 MMHG | BODY MASS INDEX: 20.91 KG/M2 | DIASTOLIC BLOOD PRESSURE: 67 MMHG

## 2017-11-29 DIAGNOSIS — R00.2 PALPITATIONS: ICD-10-CM

## 2017-11-29 DIAGNOSIS — I49.9 IRREGULAR HEART RATE: ICD-10-CM

## 2017-11-29 DIAGNOSIS — Z01.818 PRE-OP EVALUATION: ICD-10-CM

## 2017-11-29 LAB
LV EF: 50 %
LVEF MODALITY: NORMAL

## 2017-11-29 PROCEDURE — C8928 TTE W OR W/O FOL W/CON,STRES: HCPCS

## 2017-11-29 PROCEDURE — 93306 TTE W/DOPPLER COMPLETE: CPT

## 2017-11-29 PROCEDURE — 93226 XTRNL ECG REC<48 HR SCAN A/R: CPT

## 2017-11-29 PROCEDURE — 93225 XTRNL ECG REC<48 HRS REC: CPT

## 2017-11-29 PROCEDURE — 6360000004 HC RX CONTRAST MEDICATION: Performed by: INTERNAL MEDICINE

## 2017-11-29 PROCEDURE — 6360000002 HC RX W HCPCS: Performed by: INTERNAL MEDICINE

## 2017-11-29 PROCEDURE — 2580000003 HC RX 258: Performed by: INTERNAL MEDICINE

## 2017-11-29 RX ORDER — SODIUM CHLORIDE 9 MG/ML
INJECTION, SOLUTION INTRAVENOUS
Status: COMPLETED | OUTPATIENT
Start: 2017-11-29 | End: 2017-11-29

## 2017-11-29 RX ORDER — DOBUTAMINE HYDROCHLORIDE 200 MG/100ML
10 INJECTION INTRAVENOUS CONTINUOUS PRN
Status: ACTIVE | OUTPATIENT
Start: 2017-11-29 | End: 2017-11-30

## 2017-11-29 RX ADMIN — PERFLUTREN 2.2 MG: 6.52 INJECTION, SUSPENSION INTRAVENOUS at 15:00

## 2017-11-29 RX ADMIN — DOBUTAMINE HYDROCHLORIDE 10 MCG/KG/MIN: 200 INJECTION INTRAVENOUS at 15:30

## 2017-11-29 RX ADMIN — SODIUM CHLORIDE: 9 INJECTION, SOLUTION INTRAVENOUS at 15:30

## 2017-12-01 ENCOUNTER — TELEPHONE (OUTPATIENT)
Dept: CARDIOLOGY | Age: 82
End: 2017-12-01

## 2017-12-01 ENCOUNTER — HOSPITAL ENCOUNTER (OUTPATIENT)
Dept: PULMONOLOGY | Age: 82
Discharge: HOME | End: 2017-12-01
Payer: MEDICARE

## 2017-12-01 LAB
BASE EXCESS ARTERIAL: 0.6 MMOL/L (ref -2–2)
CARBOXYHEMOGLOBIN ARTERIAL: 4 % (ref 0–5)
HCO3 ARTERIAL: 24.3 MMOL/L (ref 22–26)
HEMOGLOBIN, ART, EXTENDED: 11.8 G/DL (ref 12–16)
METHEMOGLOBIN ARTERIAL: 0.6 %
O2 CONTENT ARTERIAL: 15.4 ML/DL
O2 SAT, ARTERIAL: 92.7 %
O2 THERAPY: ABNORMAL
PCO2 ARTERIAL: 35 MMHG (ref 35–45)
PH ARTERIAL: 7.45 (ref 7.35–7.45)
PO2 ARTERIAL: 74 MMHG (ref 80–100)
POTASSIUM, WHOLE BLOOD: 3.5

## 2017-12-01 PROCEDURE — 94727 GAS DIL/WSHOT DETER LNG VOL: CPT

## 2017-12-01 PROCEDURE — 94060 EVALUATION OF WHEEZING: CPT

## 2017-12-01 PROCEDURE — 36600 WITHDRAWAL OF ARTERIAL BLOOD: CPT

## 2017-12-01 PROCEDURE — 94729 DIFFUSING CAPACITY: CPT

## 2017-12-01 PROCEDURE — 6360000002 HC RX W HCPCS

## 2017-12-01 PROCEDURE — 82803 BLOOD GASES ANY COMBINATION: CPT

## 2017-12-01 PROCEDURE — 84132 ASSAY OF SERUM POTASSIUM: CPT

## 2017-12-01 RX ORDER — ALBUTEROL SULFATE 2.5 MG/3ML
2.5 SOLUTION RESPIRATORY (INHALATION) EVERY 6 HOURS PRN
Status: DISCONTINUED | OUTPATIENT
Start: 2017-12-01 | End: 2017-12-03 | Stop reason: HOSPADM

## 2017-12-01 RX ORDER — ALBUTEROL SULFATE 2.5 MG/3ML
SOLUTION RESPIRATORY (INHALATION)
Status: DISPENSED
Start: 2017-12-01 | End: 2017-12-01

## 2017-12-01 RX ADMIN — ALBUTEROL SULFATE 2.5 MG: 2.5 SOLUTION RESPIRATORY (INHALATION) at 11:00

## 2017-12-01 NOTE — TELEPHONE ENCOUNTER
Reuben Basia from EKG is calling and just wanted to let you know that she placed the holter monitor on patient and she has already brought it back and stated it didn't work the time she had it on. Patient did state she didn't want to wear it.

## 2017-12-04 ENCOUNTER — OFFICE VISIT (OUTPATIENT)
Dept: CARDIOLOGY | Age: 82
End: 2017-12-04
Payer: MEDICARE

## 2017-12-04 ENCOUNTER — TELEPHONE (OUTPATIENT)
Dept: CARDIOLOGY | Age: 82
End: 2017-12-04

## 2017-12-04 ENCOUNTER — HOSPITAL ENCOUNTER (OUTPATIENT)
Dept: GENERAL RADIOLOGY | Age: 82
Discharge: HOME | End: 2017-12-04
Payer: MEDICARE

## 2017-12-04 VITALS
WEIGHT: 132 LBS | BODY MASS INDEX: 21.99 KG/M2 | HEIGHT: 65 IN | HEART RATE: 58 BPM | SYSTOLIC BLOOD PRESSURE: 90 MMHG | DIASTOLIC BLOOD PRESSURE: 64 MMHG

## 2017-12-04 DIAGNOSIS — R07.9 CHEST PAIN IN ADULT: ICD-10-CM

## 2017-12-04 DIAGNOSIS — R07.9 CHEST PAIN IN ADULT: Primary | ICD-10-CM

## 2017-12-04 DIAGNOSIS — J44.9 CHRONIC OBSTRUCTIVE PULMONARY DISEASE, UNSPECIFIED COPD TYPE (HCC): ICD-10-CM

## 2017-12-04 DIAGNOSIS — F17.200 TOBACCO USE DISORDER: ICD-10-CM

## 2017-12-04 DIAGNOSIS — Z01.810 PREOP CARDIOVASCULAR EXAM: ICD-10-CM

## 2017-12-04 LAB
ANION GAP SERPL CALCULATED.3IONS-SCNC: 12 MMOL/L (ref 7–19)
BASOPHILS ABSOLUTE: 0.1 K/UL (ref 0–0.2)
BASOPHILS RELATIVE PERCENT: 1 % (ref 0–1)
BUN BLDV-MCNC: 16 MG/DL (ref 8–23)
CALCIUM SERPL-MCNC: 9.2 MG/DL (ref 8.8–10.2)
CHLORIDE BLD-SCNC: 99 MMOL/L (ref 98–111)
CO2: 28 MMOL/L (ref 22–29)
CREAT SERPL-MCNC: 0.9 MG/DL (ref 0.5–0.9)
EOSINOPHILS ABSOLUTE: 0.4 K/UL (ref 0–0.6)
EOSINOPHILS RELATIVE PERCENT: 6.2 % (ref 0–5)
GFR NON-AFRICAN AMERICAN: 60
GLUCOSE BLD-MCNC: 85 MG/DL (ref 74–109)
HCT VFR BLD CALC: 37.5 % (ref 37–47)
HEMOGLOBIN: 12.1 G/DL (ref 12–16)
LYMPHOCYTES ABSOLUTE: 2.3 K/UL (ref 1.1–4.5)
LYMPHOCYTES RELATIVE PERCENT: 33.9 % (ref 20–40)
MCH RBC QN AUTO: 31.9 PG (ref 27–31)
MCHC RBC AUTO-ENTMCNC: 32.3 G/DL (ref 33–37)
MCV RBC AUTO: 98.9 FL (ref 81–99)
MONOCYTES ABSOLUTE: 0.7 K/UL (ref 0–0.9)
MONOCYTES RELATIVE PERCENT: 10.6 % (ref 0–10)
NEUTROPHILS ABSOLUTE: 3.3 K/UL (ref 1.5–7.5)
NEUTROPHILS RELATIVE PERCENT: 47.9 % (ref 50–65)
PDW BLD-RTO: 13.2 % (ref 11.5–14.5)
PLATELET # BLD: 219 K/UL (ref 130–400)
PMV BLD AUTO: 9.7 FL (ref 9.4–12.3)
POTASSIUM SERPL-SCNC: 3.7 MMOL/L (ref 3.5–5)
RBC # BLD: 3.79 M/UL (ref 4.2–5.4)
SODIUM BLD-SCNC: 139 MMOL/L (ref 136–145)
WBC # BLD: 6.8 K/UL (ref 4.8–10.8)

## 2017-12-04 PROCEDURE — G8427 DOCREV CUR MEDS BY ELIG CLIN: HCPCS | Performed by: INTERNAL MEDICINE

## 2017-12-04 PROCEDURE — 3023F SPIROM DOC REV: CPT | Performed by: INTERNAL MEDICINE

## 2017-12-04 PROCEDURE — G8400 PT W/DXA NO RESULTS DOC: HCPCS | Performed by: INTERNAL MEDICINE

## 2017-12-04 PROCEDURE — 99214 OFFICE O/P EST MOD 30 MIN: CPT | Performed by: INTERNAL MEDICINE

## 2017-12-04 PROCEDURE — 1123F ACP DISCUSS/DSCN MKR DOCD: CPT | Performed by: INTERNAL MEDICINE

## 2017-12-04 PROCEDURE — 4004F PT TOBACCO SCREEN RCVD TLK: CPT | Performed by: INTERNAL MEDICINE

## 2017-12-04 PROCEDURE — 4040F PNEUMOC VAC/ADMIN/RCVD: CPT | Performed by: INTERNAL MEDICINE

## 2017-12-04 PROCEDURE — G8926 SPIRO NO PERF OR DOC: HCPCS | Performed by: INTERNAL MEDICINE

## 2017-12-04 PROCEDURE — G8420 CALC BMI NORM PARAMETERS: HCPCS | Performed by: INTERNAL MEDICINE

## 2017-12-04 PROCEDURE — G8484 FLU IMMUNIZE NO ADMIN: HCPCS | Performed by: INTERNAL MEDICINE

## 2017-12-04 PROCEDURE — 1090F PRES/ABSN URINE INCON ASSESS: CPT | Performed by: INTERNAL MEDICINE

## 2017-12-04 PROCEDURE — 71020 XR CHEST STANDARD TWO VW: CPT

## 2017-12-04 NOTE — PROGRESS NOTES
HISTORY AND PHYSICAL    Eda Segovia : 1933, 80 y.o. Female        Chief Complaint   Patient presents with    Follow-up from Office Visit of 17     no symptoms. She had chest pain during her stress test.  She is needing cardiac clearance for left shoulder surgery with Dr. Mayh Boogie in [facility]. States she has copd and has smoked since she was 12years old. History of Present Illness:     Reason for visit - Follow up of abnormal ischemic study. Ms. Eda Segovia is an 77-year-old female whom we were asked to see in order to clear her for left shoulder replacement surgery. Penny Singer has a longstanding history of tobacco use since age 12. A Dobutamine stress echocardiogram was carried out recently and was clinically positive with chest discomfort noted after the Dobutamine infusion. Additionally, her resting echo appeared to show mild lateral hypokinesis. She had no EKG changes and her ejection fraction increased appropriately. Therefore, we had some conflicting information on her ischemic study. Penny Singer has not had any unusual chest pain or dyspnea of late.         Patient Active Problem List   Diagnosis Code    Aneurysm ([address] I72.9    Migraine headache G43.909    Right facial pain R51    Chest pain R07.9    COPD (chronic obstructive pulmonary disease) (HCC) J44.9    Hiatal hernia K44.9    Shingles B02.9    Irregular heartbeat I49.9    GERD (gastroesophageal reflux disease) K21.9    Diverticulitis K57.92    Brain aneurysm I67.1    Fatigue R53.83    Cervical disc disorder with radiculopathy M50.10    Tobacco abuse Z72.0    Mixed hyperlipidemia E78.2    Abnormal EKG R94.31     Past Medical History:   Diagnosis Date    Abnormal EKG 2017    Angina     Asthma     Atherosclerosis     Brain aneurysm     with a coil    Bulging disc     Chest pain     COPD (chronic obstructive pulmonary disease) (HCC)     Diverticulitis     GERD (gastroesophageal reflux disease)     has colostomy bag    Hiatal hernia     Irregular heartbeat     Irritable bowel syndrome     Mixed hyperlipidemia 11/14/2017    Osteoporosis     Rheumatoid arthritis(714.0)     Scoliosis     Shingles     Spinal stenosis     Spondylisthesis     L5-6    Tobacco abuse 11/14/2017    She has smoked since she was 12years old, continues to smoke.  Ulcer ([address]     Uterine cancer ([address]      Past Surgical History:   Procedure Laterality Date    ABDOMINAL ADHESION SURGERY      APPENDECTOMY      CHOLECYSTECTOMY      COLON SURGERY      colostomy    ELBOW SURGERY      RIGHT    HAND SURGERY      RIGHT    HERNIA REPAIR      HYSTERECTOMY      SHOULDER SURGERY      RIGHT    SKIN CANCER EXCISION  1962    TONSILLECTOMY AND ADENOIDECTOMY      TUBAL LIGATION      TYMPANOSTOMY TUBE PLACEMENT      LEFT      Family History   Problem Relation Age of Onset    Diabetes Mother     Heart Disease Mother     Cancer Mother     Other Mother     Heart Disease Brother      2    Stroke Brother     Diabetes Brother     Cancer Brother      Social History   Substance Use Topics    Smoking status: Current Every Day Smoker     Packs/day: 0.25     Years: 51.00     Types: Cigarettes    Smokeless tobacco: Never Used      Comment: down to 5 cigs a day    Alcohol use Not on file      Allergies   Allergen Reactions    Baclofen     Ibuprofen     Medrol [Methylprednisolone]     Neosporin [Neomycin-Bacitracin Zn-Polymyx]     Nsaids     Other      Muscle relaxers, bleach, spray chemicals, all insect bitesm,     Oxycontin [Oxycodone Hcl]     Pcn [Penicillins]     Soma [Carisoprodol]     Sulfa Antibiotics      Outpatient Prescriptions Marked as Taking for the 12/4/17 encounter (Office Visit) with MIKAYLA Latif MD   Medication Sig Dispense Refill    FLUTICASONE FUROATE NA by Nasal route as needed      omeprazole (PRILOSEC) 20 MG delayed release capsule Take 20 mg by mouth 2 times daily      diarrhea, nausea or vomiting. GENITOURINARY:  Negative for dysuria, frequency or urgency. MUSCULOSKELETAL:   Positive for left shoulder pain. Negative for myalgia or arthralgia. EXTREMITIES:  Negative for clubbing, cyanosis or edema. SKIN:  Negative for color change or rash. NEUROLOGICAL:   Negative for dizziness, light-headedness, numbness or headaches. Negative for speech difficulty. HEMATOLOGICAL:   No excessive bruising or bleeding. PSYCHIATRIC/BEHAVIORAL:   No severe confusion, anxiety, or insomnia. Except as noted in the HPI, all other systems are negative. Physical Exam:  Vital Signs:  BP 90/64   Pulse 58   Ht 5' 5\" (1.651 m)   Wt 132 lb (59.9 kg)   BMI 21.97 kg/m²   Constitutional:  The patient is a pleasant 80 y.o. female in no acute distress. She appears well-developed and well-nourished. HEAD:  Normocephalic without evidence of old or recent trauma. EYES:  Sclerae clear. Conjunctivae pink. EOMs intact. Pupils equal and round. NOSE:  No nasal discharge or epistaxis. MOUTH:  Teeth, gums and palate normal.   THROAT:  No lesions on lips or buccal mucosa. Tongue protrudes in midline and is well papillated. NECK:  There are no carotid bruits. No noted jugular venous distention. No thyromegaly. CHEST:  Clear bilateral breath sounds without wheezes or rhonchi. RESPIRATORY:  The lungs reveal decreased breath sounds. CARDIOVASCULAR:   The heart's rhythm is regular without audible murmur or gallop. ABDOMEN:  Soft, no tenderness or mass. UPPER EXTREMITY EVALUATION:  Radial pulses palpable bilaterally. LOWER EXTREMITY EVALUATION:  Negative for peripheral edema. Femoral, popliteal, dorsalis pedis, and posterior tibialis pulses 2+ to palpation bilaterally. No cyanosis or clubbing. MUSCULOSKELETAL:  Normal muscle strength and tone. No atrophy or abnormalities. SKIN:  Warm, dry, intact. No dermatitis or ulcers.   NEUROLOGIC:  Intact cranial nerves II through XII and no focal weakness. Assessment / Plan:   1. Abnormal Dobutamine stress echo. We need to be able to clear her for shoulder replacement surgery. Originally, I had planned to simply risk stratify her, but now after talking with her in the office, I think it would be prudent to proceed with cardiac catheterization. Accordingly, we will schedule this for a day later this week. Unfortunately, she will need to postpone her shoulder replacement which is scheduled for Wednesday, the 6th of this month. Further plans will depend upon the result of the cardiac catheterization. _____________________________________________________  Electronically Signed by:   MIKAYLA Ren M.D., JESSICA Transcribed by Linda Delaney M[facility]  _____________________________________________________  Copy:  Dimitry Paniagua CNP  Copy:  Dr. Eva Roberto. Xiomara Ren MD Results   Narrative   XR CHEST STANDARD TWO VW 12/4/2017 9:04 AM   Two-view chest:    History: Chest pain. Tobacco use. COPD. Frontal and lateral projection chest radiograph obtained. Comparison:  None    Findings:   COPD changes appreciated. The lungs are clear without parenchymal infiltrates. The heart is normal in size. Pulmonary circulation appropriate,   without heart failure. Aortic arch and descending thoracic aorta   calcifications noted. The bony structures are intact. [de-identified]                                                                                       Impression   Impression:   1. COPD. 2. No acute cardiopulmonary process.    Signed by Dr Kelly Lopez on 12/4/2017 1:35 PM         Basic Metabolic Panel    Collection Time: 12/04/17  9:49 AM   Result Value Ref Range    Sodium 139 136 - 145 mmol/L    Potassium 3.7 3.5 - 5.0 mmol/L    Chloride 99 98 - 111 mmol/L    CO2 28 22 - 29 mmol/L    Anion Gap 12 7 - 19 mmol/L    Glucose 85 74 - 109 mg/dL    BUN 16 8 - 23 mg/dL CREATININE 0.9 0.5 - 0.9 mg/dL    GFR Non-African American 60 (A) >60    Calcium 9.2 8.8 - 10.2 mg/dL   CBC Auto Differential    Collection Time: 12/04/17  9:49 AM   Result Value Ref Range    WBC 6.8 4.8 - 10.8 K/uL    RBC 3.79 (L) 4.20 - 5.40 M/uL    Hemoglobin 12.1 12.0 - 16.0 g/dL    Hematocrit 37.5 37.0 - 47.0 %    MCV 98.9 81.0 - 99.0 fL    MCH 31.9 (H) 27.0 - 31.0 pg    MCHC 32.3 (L) 33.0 - 37.0 g/dL    RDW 13.2 11.5 - 14.5 %    Platelets 972 951 - 430 K/uL    MPV 9.7 9.4 - 12.3 fL    Neutrophils % 47.9 (L) 50.0 - 65.0 %    Lymphocytes % 33.9 20.0 - 40.0 %    Monocytes % 10.6 (H) 0.0 - 10.0 %    Eosinophils % 6.2 (H) 0.0 - 5.0 %    Basophils % 1.0 0.0 - 1.0 %    Neutrophils # 3.3 1.5 - 7.5 K/uL    Lymphocytes # 2.3 1.1 - 4.5 K/uL    Monocytes # 0.70 0.00 - 0.90 K/uL    Eosinophils # 0.40 0.00 - 0.60 K/uL    Basophils # 0.10 0.00 - 0.20 K/uL

## 2017-12-04 NOTE — PATIENT INSTRUCTIONS
[place] at the [facility] and [address] [facility] located on the first floor of [place] [address] through hospital main entrance and turn immediately to your left. Date/Time:    Wednesday, December 6th. Arrive at 9:00 am.      Pre-operative work-up:  CBC, BMP, and Chest x-ray. Allergies:  Baclofen; Ibuprofen; Medrol [methylprednisolone]; Neosporin [neomycin-bacitracin zn-polymyx]; Nsaids; Other; Oxycontin [oxycodone hcl]; Pcn [penicillins]; Soma [carisoprodol]; and Sulfa antibiotics   Contact number:  [phone] (home)     Cardiac Catheterization Instructions   · Do not eat or drink anything after midnight (or 8 hours) prior to the procedure. Please take your morning medication with a small amount of water at your normally scheduled time, including any blood pressure pills, Plavix, and aspirin. · Do not eat or drink anything containing caffeine for at least 24 hours prior to your procedure. · Do not take any diuretics (water pills) such as Lasix, Bumex, Demadex, Furosemide, and Zaroxolyn on the day of your procedure. If your diuretic is combined with your blood pressure medication, you will take it as usual.      · Diabetic medication should be stopped two days prior: Metformin (glucophage), Invokana (canagliflozin), Farxiga (dapagliflozin), Jardiance (empagliflozin)   · Coumadin (warfarin) should be stopped two days prior to this procedure. · Xarelto (ravaroxaban), Eliquis (apixaban), or Pradaxa (dabigatran) should be stopped one day prior to procedure. · Bring a list of the names and dosages of all the medications you are taking. · You must have someone to drive you home - you are not allowed to drive for 24 hours after your procedure. If an intervention is performed, you will stay overnight at the hospital.    · Further plan will depend upon the result of the cardiac catheterization. WHAT IS A CARDIAC CATHETERIZATION?   This is a procedure that providers your

## 2017-12-04 NOTE — TELEPHONE ENCOUNTER
Cardiac clearance not approved. A surgical clearance request was initiated for this patient for the following procedure: shoulder surgery    After considering the patient's currently state of cardiac health, this patient has NOT been risk to move forward with the surgical procedure indicated. The reason for this plan of action is because the patient's provider is requiring the patient to obtain a cardiac cath procedure. Pt was seen in office today by Dr. Lo Perez and due to conflicting information on ischemic study, he will do cardiac cath procedure on 12-6-17. Please note that I called [facility], Dr. Megan Calderón, Dr. León Daily office to make them aware. Informed them that when the pt has been cleared for surgery, a risk stratification letter will be sent to them.

## 2017-12-05 ENCOUNTER — TELEPHONE (OUTPATIENT)
Dept: CARDIOLOGY | Age: 82
End: 2017-12-05

## 2017-12-05 NOTE — TELEPHONE ENCOUNTER
Let her know that she is supposed to arrivae at 0900am but her [facility] medicaid has not authorized the heart cath and I may not receive authorization today. I will call her at the end of the day and tell her if I have received the OK and if not I will have to reschedule it.

## 2017-12-05 NOTE — H&P
Progress Notes  Unsigned   Encounter Date: 2017  Rosaline Ahumada   Expand All Collapse All    []Hide copied text  HISTORY AND PHYSICAL     Candice Lorenzana : 1933, 80 y.o. Female              Chief Complaint   Patient presents with    Follow-up from Office Visit of 17       no symptoms. She had chest pain during her stress test.  She is needing cardiac clearance for left shoulder surgery with Dr. Yuli Mora in [place]. States she has copd and has smoked since she was 12years old.        History of Present Illness:      Reason for visit - Follow up of abnormal ischemic study.       Ms. Candice Lorenzana is an 59-year-old female whom we were asked to see in order to clear her for left shoulder replacement surgery. Treasure Trejo has a longstanding history of tobacco use since age 12. A Dobutamine stress echocardiogram was carried out recently and was clinically positive with chest discomfort noted after the Dobutamine infusion. Additionally, her resting echo appeared to show mild lateral hypokinesis. She had no EKG changes and her ejection fraction increased appropriately. Therefore, we had some conflicting information on her ischemic study.   Treasure Trejo has not had any unusual chest pain or dyspnea of late.              Patient Active Problem List   Diagnosis Code    Aneurysm ([address] I72.9    Migraine headache G43.909    Right facial pain R51    Chest pain R07.9    COPD (chronic obstructive pulmonary disease) ([facility]) J44.9    Hiatal hernia K44.9    Shingles B02.9    Irregular heartbeat I49.9    GERD (gastroesophageal reflux disease) K21.9    Diverticulitis K57.92    Brain aneurysm I67.1    Fatigue R53.83    Cervical disc disorder with radiculopathy M50.10    Tobacco abuse Z72.0    Mixed hyperlipidemia E78.2    Abnormal EKG R94.31      Past Medical History        Past Medical History:   Diagnosis Date    Abnormal EKG 2017    Angina      Asthma      Atherosclerosis      Brain weight change. Negative for fever, chills or diaphoresis. HENT:  Negative for nosebleeds, facial swelling, rhinorrhea or neck stiffness. RESPIRATORY:  Negative for shortness of breath. No cough, wheezing or stridor. CARDIOVASCULAR:  Negative for chest pain, palpitations or leg swelling. GASTROINTESTINAL:   Negative for abdominal distention or pain. Negative for constipation, diarrhea, nausea or vomiting. GENITOURINARY:  Negative for dysuria, frequency or urgency. MUSCULOSKELETAL:   Positive for left shoulder pain. Negative for myalgia or arthralgia. EXTREMITIES:  Negative for clubbing, cyanosis or edema. SKIN:  Negative for color change or rash. NEUROLOGICAL:   Negative for dizziness, light-headedness, numbness or headaches. Negative for speech difficulty. HEMATOLOGICAL:   No excessive bruising or bleeding. PSYCHIATRIC/BEHAVIORAL:   No severe confusion, anxiety, or insomnia. Except as noted in the HPI, all other systems are negative.          Physical Exam:  Vital Signs:  BP 90/64   Pulse 58   Ht 5' 5\" (1.651 m)   Wt 132 lb (59.9 kg)   BMI 21.97 kg/m²   Constitutional:  The patient is a pleasant 80 y.o. female in no acute distress. She appears well-developed and well-nourished. HEAD:  Normocephalic without evidence of old or recent trauma. EYES:  Sclerae clear. Conjunctivae pink. EOMs intact. Pupils equal and round. NOSE:  No nasal discharge or epistaxis. MOUTH:  Teeth, gums and palate normal.   THROAT:  No lesions on lips or buccal mucosa. Tongue protrudes in midline and is well papillated. NECK:  There are no carotid bruits. No noted jugular venous distention. No thyromegaly. CHEST:  Clear bilateral breath sounds without wheezes or rhonchi. RESPIRATORY:  The lungs reveal decreased breath sounds. CARDIOVASCULAR:   The heart's rhythm is regular without audible murmur or gallop. ABDOMEN:  Soft, no tenderness or mass.         UPPER EXTREMITY EVALUATION: Radial pulses palpable bilaterally. LOWER EXTREMITY EVALUATION:  Negative for peripheral edema. Femoral, popliteal, dorsalis pedis, and posterior tibialis pulses 2+ to palpation bilaterally. No cyanosis or clubbing. MUSCULOSKELETAL:  Normal muscle strength and tone. No atrophy or abnormalities. SKIN:  Warm, dry, intact. No dermatitis or ulcers. NEUROLOGIC:  Intact cranial nerves II through XII and no focal weakness.        Assessment / Plan:   1. Abnormal Dobutamine stress echo. We need to be able to clear her for shoulder replacement surgery. Originally, I had planned to simply risk stratify her, but now after talking with her in the office, I think it would be prudent to proceed with cardiac catheterization. Accordingly, we will schedule this for a day later this week. Unfortunately, she will need to postpone her shoulder replacement which is scheduled for Wednesday, the 6th of this month. Further plans will depend upon the result of the cardiac catheterization.          _____________________________________________________  Electronically Signed by:   MIKAYLA Duke M.D., JESSICA Transcribed by Reece Esteban M[facility]  _____________________________________________________  Copy:  Felipa Page, CNP  Copy:  Dr. Irma Moctezuma. Devendra Duke MD Results   Narrative   XR CHEST STANDARD TWO VW 12/4/2017 9:04 AM   Two-view chest:    History: Chest pain. Tobacco use. COPD. Frontal and lateral projection chest radiograph obtained. Comparison:  None    Findings:   COPD changes appreciated. The lungs are clear without parenchymal infiltrates. The heart is normal in size. Pulmonary circulation appropriate,   without heart failure. Aortic arch and descending thoracic aorta   calcifications noted. The bony structures are intact. [de-identified]                                                                                       Impression   Impression:   1. COPD.   2. No acute cardiopulmonary process. Signed by Dr Rob Parikh on 12/4/2017 1:35 PM                Basic Metabolic Panel     Collection Time: 12/04/17  9:49 AM   Result Value Ref Range     Sodium 139 136 - 145 mmol/L     Potassium 3.7 3.5 - 5.0 mmol/L     Chloride 99 98 - 111 mmol/L     CO2 28 22 - 29 mmol/L     Anion Gap 12 7 - 19 mmol/L     Glucose 85 74 - 109 mg/dL     BUN 16 8 - 23 mg/dL     CREATININE 0.9 0.5 - 0.9 mg/dL     GFR Non- 60 (A) >60     Calcium 9.2 8.8 - 10.2 mg/dL   CBC Auto Differential     Collection Time: 12/04/17  9:49 AM   Result Value Ref Range     WBC 6.8 4.8 - 10.8 K/uL     RBC 3.79 (L) 4.20 - 5.40 M/uL     Hemoglobin 12.1 12.0 - 16.0 g/dL     Hematocrit 37.5 37.0 - 47.0 %     MCV 98.9 81.0 - 99.0 fL     MCH 31.9 (H) 27.0 - 31.0 pg     MCHC 32.3 (L) 33.0 - 37.0 g/dL     RDW 13.2 11.5 - 14.5 %     Platelets 442 722 - 308 K/uL     MPV 9.7 9.4 - 12.3 fL     Neutrophils % 47.9 (L) 50.0 - 65.0 %     Lymphocytes % 33.9 20.0 - 40.0 %     Monocytes % 10.6 (H) 0.0 - 10.0 %     Eosinophils % 6.2 (H) 0.0 - 5.0 %     Basophils % 1.0 0.0 - 1.0 %     Neutrophils # 3.3 1.5 - 7.5 K/uL     Lymphocytes # 2.3 1.1 - 4.5 K/uL     Monocytes # 0.70 0.00 - 0.90 K/uL     Eosinophils # 0.40 0.00 - 0.60 K/uL     Basophils # 0.10 0.00 - 0.20 K/uL              Office Visit on 12/4/2017            Detailed Report        Progress Notes Info     Author Note Status Last Update User   Sudha Ortiz Sign at close encounter Sudha Ortiz   Last Update Date/Time: 12/5/2017  8:11 AM   Chart Review Routing History     Routing history could not be found for this note. This is because the note has never been routed or because communication record creation was suppressed. Cardiology:  Since this office note of 12/4/17 the patient has been reinterviewed and reexamined. There have been no significant change in the patient's history review of systems or physical examination since that time.  Plan cardiac

## 2017-12-06 ENCOUNTER — HOSPITAL ENCOUNTER (OUTPATIENT)
Dept: CARDIAC CATH/INVASIVE PROCEDURES | Age: 82
Discharge: HOME | End: 2017-12-06
Attending: INTERNAL MEDICINE | Admitting: INTERNAL MEDICINE
Payer: MEDICARE

## 2017-12-06 VITALS
WEIGHT: 132 LBS | HEIGHT: 65 IN | BODY MASS INDEX: 21.99 KG/M2 | SYSTOLIC BLOOD PRESSURE: 105 MMHG | DIASTOLIC BLOOD PRESSURE: 49 MMHG | HEART RATE: 59 BPM | OXYGEN SATURATION: 96 % | RESPIRATION RATE: 21 BRPM | TEMPERATURE: 98.2 F

## 2017-12-06 PROCEDURE — C1760 CLOSURE DEV, VASC: HCPCS

## 2017-12-06 PROCEDURE — C1894 INTRO/SHEATH, NON-LASER: HCPCS

## 2017-12-06 PROCEDURE — C1887 CATHETER, GUIDING: HCPCS

## 2017-12-06 PROCEDURE — 2580000003 HC RX 258: Performed by: INTERNAL MEDICINE

## 2017-12-06 PROCEDURE — 2720000001 HC MISC SURG SUPPLY STERILE $51-500

## 2017-12-06 PROCEDURE — 93458 L HRT ARTERY/VENTRICLE ANGIO: CPT | Performed by: INTERNAL MEDICINE

## 2017-12-06 PROCEDURE — 2500000003 HC RX 250 WO HCPCS

## 2017-12-06 PROCEDURE — 2709999900 HC NON-CHARGEABLE SUPPLY

## 2017-12-06 PROCEDURE — 6360000002 HC RX W HCPCS

## 2017-12-06 RX ORDER — SODIUM CHLORIDE 0.9 % (FLUSH) 0.9 %
10 SYRINGE (ML) INJECTION PRN
Status: DISCONTINUED | OUTPATIENT
Start: 2017-12-06 | End: 2017-12-06 | Stop reason: HOSPADM

## 2017-12-06 RX ORDER — SODIUM CHLORIDE 9 MG/ML
INJECTION, SOLUTION INTRAVENOUS CONTINUOUS
Status: DISCONTINUED | OUTPATIENT
Start: 2017-12-06 | End: 2017-12-06 | Stop reason: HOSPADM

## 2017-12-06 RX ORDER — SODIUM CHLORIDE 9 MG/ML
INJECTION, SOLUTION INTRAVENOUS CONTINUOUS
Status: DISCONTINUED | OUTPATIENT
Start: 2017-12-06 | End: 2017-12-06

## 2017-12-06 RX ORDER — SODIUM CHLORIDE 0.9 % (FLUSH) 0.9 %
10 SYRINGE (ML) INJECTION EVERY 12 HOURS SCHEDULED
Status: DISCONTINUED | OUTPATIENT
Start: 2017-12-06 | End: 2017-12-06 | Stop reason: HOSPADM

## 2017-12-06 RX ADMIN — SODIUM CHLORIDE: 9 INJECTION, SOLUTION INTRAVENOUS at 10:01

## 2017-12-08 ENCOUNTER — TELEPHONE (OUTPATIENT)
Dept: CARDIOLOGY | Age: 82
End: 2017-12-08

## 2017-12-08 NOTE — TELEPHONE ENCOUNTER
Notification of Approved Cardiac Clearance    A surgical clearance request was initiated for this patient for the following procedure: shoulder surgery    After considering the patient's currently state of cardiac health, this patient has been risk to move forward with the surgical procedure indicated. Please note a letter has been forwarded to the office of Dr. Monet Chino to make them aware.

## 2017-12-11 ENCOUNTER — OFFICE VISIT (OUTPATIENT)
Dept: CARDIOLOGY | Age: 82
End: 2017-12-11
Payer: MEDICARE

## 2017-12-11 ENCOUNTER — HOSPITAL ENCOUNTER (OUTPATIENT)
Dept: VASCULAR LAB | Age: 82
Discharge: HOME | End: 2017-12-11
Payer: MEDICARE

## 2017-12-11 ENCOUNTER — TELEPHONE (OUTPATIENT)
Dept: CARDIOLOGY | Age: 82
End: 2017-12-11

## 2017-12-11 VITALS
HEIGHT: 60 IN | SYSTOLIC BLOOD PRESSURE: 112 MMHG | HEART RATE: 80 BPM | BODY MASS INDEX: 25.91 KG/M2 | DIASTOLIC BLOOD PRESSURE: 60 MMHG | WEIGHT: 132 LBS

## 2017-12-11 DIAGNOSIS — Z98.890 S/P LEFT HEART CATHETERIZATION BY PERCUTANEOUS APPROACH: ICD-10-CM

## 2017-12-11 DIAGNOSIS — I72.4 PSEUDOANEURYSM OF RIGHT FEMORAL ARTERY (HCC): ICD-10-CM

## 2017-12-11 DIAGNOSIS — L03.314 CELLULITIS OF RIGHT GROIN: ICD-10-CM

## 2017-12-11 DIAGNOSIS — I72.4 PSEUDOANEURYSM OF RIGHT FEMORAL ARTERY (HCC): Primary | ICD-10-CM

## 2017-12-11 DIAGNOSIS — I25.10 MILD CAD: Primary | ICD-10-CM

## 2017-12-11 PROCEDURE — G8598 ASA/ANTIPLAT THER USED: HCPCS | Performed by: CLINICAL NURSE SPECIALIST

## 2017-12-11 PROCEDURE — G8484 FLU IMMUNIZE NO ADMIN: HCPCS | Performed by: CLINICAL NURSE SPECIALIST

## 2017-12-11 PROCEDURE — 4040F PNEUMOC VAC/ADMIN/RCVD: CPT | Performed by: CLINICAL NURSE SPECIALIST

## 2017-12-11 PROCEDURE — 1090F PRES/ABSN URINE INCON ASSESS: CPT | Performed by: CLINICAL NURSE SPECIALIST

## 2017-12-11 PROCEDURE — 1123F ACP DISCUSS/DSCN MKR DOCD: CPT | Performed by: CLINICAL NURSE SPECIALIST

## 2017-12-11 PROCEDURE — 4004F PT TOBACCO SCREEN RCVD TLK: CPT | Performed by: CLINICAL NURSE SPECIALIST

## 2017-12-11 PROCEDURE — 93926 LOWER EXTREMITY STUDY: CPT

## 2017-12-11 PROCEDURE — 99213 OFFICE O/P EST LOW 20 MIN: CPT | Performed by: CLINICAL NURSE SPECIALIST

## 2017-12-11 PROCEDURE — G8400 PT W/DXA NO RESULTS DOC: HCPCS | Performed by: CLINICAL NURSE SPECIALIST

## 2017-12-11 PROCEDURE — G8427 DOCREV CUR MEDS BY ELIG CLIN: HCPCS | Performed by: CLINICAL NURSE SPECIALIST

## 2017-12-11 PROCEDURE — G8419 CALC BMI OUT NRM PARAM NOF/U: HCPCS | Performed by: CLINICAL NURSE SPECIALIST

## 2017-12-11 PROCEDURE — 93922 UPR/L XTREMITY ART 2 LEVELS: CPT

## 2017-12-11 RX ORDER — CLINDAMYCIN HYDROCHLORIDE 300 MG/1
300 CAPSULE ORAL 3 TIMES DAILY
Qty: 30 CAPSULE | Refills: 0 | Status: SHIPPED | OUTPATIENT
Start: 2017-12-11 | End: 2017-12-21

## 2017-12-11 ASSESSMENT — ENCOUNTER SYMPTOMS
COUGH: 0
BLURRED VISION: 0
VOMITING: 0
HEARTBURN: 0
ORTHOPNEA: 0
SHORTNESS OF BREATH: 1
NAUSEA: 0

## 2017-12-11 NOTE — TELEPHONE ENCOUNTER
Patient recently had cath and over weekend has noticed swelling in right groin, redness and pain. Patient was scheduled for groin scan with ANA CRISTINA today at 1.

## 2017-12-11 NOTE — PROGRESS NOTES
[facility], [address] [place] [address]  Phone: [phone]  Fax: [phone]    OFFICE VISIT:  2017    Carlo Irizarry - : 1933    Reason For Visit:  Esther Candelaria is a 80 y.o. female who is here for follow-up status post heart cath 17    HPI   Patient is here for follow-up status post heart cath. She call the office today with complaints of redness and pain in her groin. Ultrasound of the groin showed no pseudoaneurysm. Her groin is red and uncomfortable. She denies any drainage or fever. Heart cath showed nonocclusive CAD. Patient was in need of surgical clearance for an orthopedic procedure    Nidhi Sun CNP is PCP.   Carlo Irizarry has the following history as recorded in [facility]:    Patient Active Problem List    Diagnosis Date Noted    Mild CAD 2017    Cellulitis of right groin 2017    Abnormal stress echocardiogram     Precordial pain     Tobacco abuse 2017    Mixed hyperlipidemia 2017    Abnormal EKG 2017    Cervical disc disorder with radiculopathy 2017    Fatigue 2012    Chest pain     COPD (chronic obstructive pulmonary disease) (HCC)     Hiatal hernia     Shingles     Irregular heartbeat     GERD (gastroesophageal reflux disease)     Diverticulitis     Brain aneurysm     Aneurysm ([facility] [address] 2011    Migraine headache 2011    Right facial pain 2011     Past Medical History:   Diagnosis Date    Abnormal EKG 2017    Angina     Arthritis     Asthma     Atherosclerosis     Brain aneurysm     with a coil    Bulging disc     Cellulitis of right groin 2017    Chest pain     COPD (chronic obstructive pulmonary disease) (HCC)     Diverticulitis     GERD (gastroesophageal reflux disease)     has colostomy bag    Hiatal hernia     Irregular heartbeat     Irritable bowel syndrome     Mild CAD 2017    Mixed hyperlipidemia ipratropium-albuterol (DUONEB) 0.5-2.5 (3) MG/3ML SOLN nebulizer solution Inhale 1 vial into the lungs 4 times daily      Oxygen Concentrator Indications: uses 8-10 hrs nightly      albuterol (PROVENTIL HFA;VENTOLIN HFA) 108 (90 BASE) MCG/ACT inhaler Inhale 2 puffs into the lungs every 6 hours as needed.  potassium chloride (KLOR-CON) 10 MEQ CR tablet Take 10 mEq by mouth 2 times daily.  aspirin 325 MG EC tablet Take 325 mg by mouth daily.  salmeterol (SEREVENT DISKUS) 50 MCG/DOSE diskus inhaler Inhale 1 puff into the lungs 2 times daily.  indapamide (LOZOL) 2.5 MG tablet Take 2.5 mg by mouth every morning.  atorvastatin (LIPITOR) 40 MG tablet Take 40 mg by mouth daily. No current facility-administered medications for this visit. Allergies: Baclofen; Ibuprofen; Medrol [methylprednisolone]; Neosporin [neomycin-bacitracin zn-polymyx]; Nsaids; Other; Oxycontin [oxycodone hcl]; Pcn [penicillins]; Soma [carisoprodol]; and Sulfa antibiotics    Review of Systems  Review of Systems   Constitutional: Positive for malaise/fatigue. Negative for chills and fever. HENT: Negative for congestion. Eyes: Negative for blurred vision. Respiratory: Positive for shortness of breath. Negative for cough. Cardiovascular: Negative for chest pain, palpitations, orthopnea, leg swelling and PND. Gastrointestinal: Negative for heartburn, nausea and vomiting. Musculoskeletal: Negative for falls and myalgias. Skin: Negative for rash. Erythema groin   Neurological: Negative for dizziness, sensory change, speech change and focal weakness. Endo/Heme/Allergies: Does not bruise/bleed easily. Psychiatric/Behavioral: Negative for depression. The patient is not nervous/anxious. Objective  Vital Signs - /60   Pulse 80   Ht 5' (1.524 m)   Wt 132 lb (59.9 kg)   BMI 25.78 kg/m²   Physical Exam   Constitutional: She is oriented to person, place, and time.  She appears well-developed and well-nourished. No distress. HENT:   Head: Normocephalic and atraumatic. Eyes: Pupils are equal, round, and reactive to light. Right eye exhibits no discharge. Left eye exhibits no discharge. Neck: No JVD present. No tracheal deviation present. Cardiovascular: Normal rate, regular rhythm, normal heart sounds and intact distal pulses. Exam reveals no gallop and no friction rub. No murmur heard. No carotid bruit   Pulmonary/Chest: Effort normal and breath sounds normal. No respiratory distress. She has no wheezes. She has no rales. Abdominal: Soft. There is no tenderness. Musculoskeletal: She exhibits no edema. Normal gait and station   Neurological: She is alert and oriented to person, place, and time. No cranial nerve deficit. Skin: Skin is warm and dry. No rash noted. Psychiatric: She has a normal mood and affect. Her behavior is normal. Judgment normal.   Nursing note and vitals reviewed. Assessment:    1. Mild CAD     2. Cellulitis of right groin     3. S/P left heart catheterization by percutaneous approach       Patient is taking medications as prescribed    Cardiac Cath 12/6/17  Impression:     1.  Normal left ventricular systolic function.   2.  Normal left ventricular hemodynamics.   3.  Non-occlusive coronary artery disease as described above.   4.  Right dominant system. Patient appears to have a groin infection complication from her heart cath. No pseudoaneurysm was noted per groin scan. Patient has multiple allergies. Plan will be to start clindamycin 300 mg 3 times a day for 10 days. I've encouraged patient to take a probiotic with this medication to prevent diarrhea. She should clean the groin area daily with antibacterial soap and pat dry. She should call for fever or drainage or increased pain. Follow-up in 10 days    Stable cardiovascular status. No evidence of overt heart failure, angina or dysrhythmia.      Plan    Followup With APRN 10 days  Clindamycin 300mg 3 x day for 10 days  Try on over the counter- Probiotic  Wash daily with antibacterial liquid soap such as Dial and pat dry    Call with any questions or concerns  Follow up with Roseline Miller CNP for non cardiac problems  Report any new problems  Cardiovascular Fitness-Exercise as tolerated. Strive for 15 minutes of exercise most days of the week. Cardiac / Healthy Diet  Continue current medications as directed  Continue plan of treatment  It is always recommended that you bring your medications bottles with you to each visit - this is for your safety!        GALEN Cook

## 2017-12-21 ENCOUNTER — OFFICE VISIT (OUTPATIENT)
Dept: CARDIOLOGY | Age: 82
End: 2017-12-21
Payer: MEDICARE

## 2017-12-21 VITALS
BODY MASS INDEX: 22.13 KG/M2 | DIASTOLIC BLOOD PRESSURE: 58 MMHG | SYSTOLIC BLOOD PRESSURE: 110 MMHG | HEART RATE: 60 BPM | WEIGHT: 132.8 LBS | HEIGHT: 65 IN

## 2017-12-21 DIAGNOSIS — R00.2 PALPITATIONS: ICD-10-CM

## 2017-12-21 DIAGNOSIS — I25.10 MILD CAD: ICD-10-CM

## 2017-12-21 DIAGNOSIS — Z98.890 S/P LEFT HEART CATHETERIZATION BY PERCUTANEOUS APPROACH: ICD-10-CM

## 2017-12-21 DIAGNOSIS — L03.314 CELLULITIS OF RIGHT GROIN: Primary | ICD-10-CM

## 2017-12-21 PROCEDURE — G8598 ASA/ANTIPLAT THER USED: HCPCS | Performed by: CLINICAL NURSE SPECIALIST

## 2017-12-21 PROCEDURE — 1123F ACP DISCUSS/DSCN MKR DOCD: CPT | Performed by: CLINICAL NURSE SPECIALIST

## 2017-12-21 PROCEDURE — G8428 CUR MEDS NOT DOCUMENT: HCPCS | Performed by: CLINICAL NURSE SPECIALIST

## 2017-12-21 PROCEDURE — 4004F PT TOBACCO SCREEN RCVD TLK: CPT | Performed by: CLINICAL NURSE SPECIALIST

## 2017-12-21 PROCEDURE — 1090F PRES/ABSN URINE INCON ASSESS: CPT | Performed by: CLINICAL NURSE SPECIALIST

## 2017-12-21 PROCEDURE — G8420 CALC BMI NORM PARAMETERS: HCPCS | Performed by: CLINICAL NURSE SPECIALIST

## 2017-12-21 PROCEDURE — 99213 OFFICE O/P EST LOW 20 MIN: CPT | Performed by: CLINICAL NURSE SPECIALIST

## 2017-12-21 PROCEDURE — 4040F PNEUMOC VAC/ADMIN/RCVD: CPT | Performed by: CLINICAL NURSE SPECIALIST

## 2017-12-21 PROCEDURE — G8484 FLU IMMUNIZE NO ADMIN: HCPCS | Performed by: CLINICAL NURSE SPECIALIST

## 2017-12-21 PROCEDURE — G8400 PT W/DXA NO RESULTS DOC: HCPCS | Performed by: CLINICAL NURSE SPECIALIST

## 2017-12-21 RX ORDER — METOPROLOL SUCCINATE 25 MG/1
12.5 TABLET, EXTENDED RELEASE ORAL DAILY
Qty: 30 TABLET | Refills: 5 | Status: SHIPPED | OUTPATIENT
Start: 2017-12-21 | End: 2019-08-20

## 2017-12-21 ASSESSMENT — ENCOUNTER SYMPTOMS
VOMITING: 0
ORTHOPNEA: 0
HEARTBURN: 0
SHORTNESS OF BREATH: 1
BLURRED VISION: 0
NAUSEA: 0
COUGH: 0

## 2017-12-21 NOTE — PATIENT INSTRUCTIONS
lightheaded, lie down to avoid injuries that might result if you pass out and fall down. · Keep a record of your palpitations and bring it to your next doctor's appointment. Write down:  ¨ The date and time. ¨ Your pulse. (If your heart is beating fast, it may be hard to count your pulse.)  ¨ What you were doing when the palpitations started. ¨ How long the palpitations lasted. ¨ Any other symptoms. · If an activity causes palpitations, slow down or stop. Talk to your doctor before you do that activity again. · Take your medicines exactly as prescribed. Call your doctor if you think you are having a problem with your medicine. When should you call for help? Call 911 anytime you think you may need emergency care. For example, call if:  ? · You passed out (lost consciousness). ? · You have symptoms of a heart attack. These may include:  ¨ Chest pain or pressure, or a strange feeling in the chest.  ¨ Sweating. ¨ Shortness of breath. ¨ Pain, pressure, or a strange feeling in the back, neck, jaw, or upper belly or in one or both shoulders or arms. ¨ Lightheadedness or sudden weakness. ¨ A fast or irregular heartbeat. After you call 911, the  may tell you to chew 1 adult-strength or 2 to 4 low-dose aspirin. Wait for an ambulance. Do not try to drive yourself. ? · You have symptoms of a stroke. These may include:  ¨ Sudden numbness, tingling, weakness, or loss of movement in your face, arm, or leg, especially on only one side of your body. ¨ Sudden vision changes. ¨ Sudden trouble speaking. ¨ Sudden confusion or trouble understanding simple statements. ¨ Sudden problems with walking or balance. ¨ A sudden, severe headache that is different from past headaches. ?Call your doctor now or seek immediate medical care if:  ? · You have heart palpitations and:  ¨ Are dizzy or lightheaded, or you feel like you may faint. ¨ Have new or increased shortness of breath. ? Watch closely for changes in your health, and be sure to contact your doctor if:  ? · You continue to have heart palpitations. Where can you learn more? Go to [email or web address] and sign in to your [employer] account. Enter R508 in the [employer] box to learn more about \"Palpitations: Care Instructions. \"     If you do not have an account, please click on the \"Sign Up Now\" link. Current as of: September 21, 2016  Content Version: 11.4  © 6079-0726 [employer]wise, [employer]. Care instructions adapted under license by [facility] ([facility]). If you have questions about a medical condition or this instruction, always ask your healthcare professional. [address] any warranty or liability for your use of this information.

## 2017-12-21 NOTE — PROGRESS NOTES
[facility], [address] , [place] [address]  Phone: [phone]  Fax: [phone]    OFFICE VISIT:  2017    Sergey Baumgarten - : 1933    Reason For Visit:  Bud Holman is a 80 y.o. female who is here for follow-up for infected cath site    HPI  Patient is here for follow-up status post infected cath site. She will be completing a 10 day course of clindamycin today. Her right groin cath site is much improved. The erythema has resolved. She states she did have a small amount of yellow, puslike drainage that occurred a few days ago, but none since. There have been no fevers. She still has some soreness at the site, but it is improving. She continues to complain of palpitations. Her two-week heart monitor showed some brief SVT. Felipa Page CNP is PCP.   Allen Baumgarten has the following history as recorded in [facility]:    Patient Active Problem List    Diagnosis Date Noted    Mild CAD 2017    Cellulitis of right groin 2017    Abnormal stress echocardiogram     Precordial pain     Tobacco abuse 2017    Mixed hyperlipidemia 2017    Abnormal EKG 2017    Cervical disc disorder with radiculopathy 2017    Fatigue 2012    Chest pain     COPD (chronic obstructive pulmonary disease) (HCC)     Hiatal hernia     Shingles     Irregular heartbeat     GERD (gastroesophageal reflux disease)     Diverticulitis     Brain aneurysm     Aneurysm ([facility] [address] 2011    Migraine headache 2011    Right facial pain 2011     Past Medical History:   Diagnosis Date    Abnormal EKG 2017    Angina     Arthritis     Asthma     Atherosclerosis     Brain aneurysm     with a coil    Bulging disc     Cellulitis of right groin 2017    Chest pain     COPD (chronic obstructive pulmonary disease) (HCC)     Diverticulitis     GERD (gastroesophageal reflux disease)     has colostomy bag    nervous/anxious. Objective  Vital Signs - BP (!) 110/58   Pulse (!) 40   Ht 5' 5\" (1.651 m)   Wt 132 lb 12.8 oz (60.2 kg)   BMI 22.10 kg/m²   Physical Exam   Constitutional: She is oriented to person, place, and time. She appears well-developed and well-nourished. No distress. HENT:   Head: Normocephalic and atraumatic. Eyes: Pupils are equal, round, and reactive to light. Right eye exhibits no discharge. Left eye exhibits no discharge. Neck: No JVD present. No tracheal deviation present. Cardiovascular: Normal rate, regular rhythm, normal heart sounds and intact distal pulses. Exam reveals no gallop and no friction rub. No murmur heard. No carotid bruit   Pulmonary/Chest: Effort normal and breath sounds normal. No respiratory distress. She has no wheezes. She has no rales. Abdominal: Soft. There is no tenderness. Musculoskeletal: She exhibits no edema. Neurological: She is alert and oriented to person, place, and time. No cranial nerve deficit. Skin: Skin is warm and dry. No rash noted. Right groin site improved. No erythema or drainage   Psychiatric: She has a normal mood and affect. Her behavior is normal. Judgment normal.   Nursing note and vitals reviewed. Assessment:    1. Cellulitis of right groin     2. S/P left heart catheterization by percutaneous approach     3. Mild CAD     4. Palpitations       Patient is taking medications as prescribed    Cardiac Cath 12/6/17  Impression:     1.  Normal left ventricular systolic function.   2.  Normal left ventricular hemodynamics.   3.  Non-occlusive coronary artery disease as described above.   4.  Right dominant system. Arterial Duplex Studies 12/11/17  Impression        Right groin Duplex exam shows no large hematoma, no psuedoaneurysm, no AV    fistula and proximal DVT. No obvious arterial injury. Reviewed recent Zio patch 2 week heart monitor with patient- very brief SVT.  Patient continues to complain of

## 2018-01-03 ENCOUNTER — OFFICE VISIT (OUTPATIENT)
Dept: CARDIOLOGY | Age: 83
End: 2018-01-03
Payer: MEDICARE

## 2018-01-03 VITALS
BODY MASS INDEX: 22.33 KG/M2 | SYSTOLIC BLOOD PRESSURE: 118 MMHG | HEART RATE: 68 BPM | HEIGHT: 65 IN | WEIGHT: 134 LBS | DIASTOLIC BLOOD PRESSURE: 60 MMHG

## 2018-01-03 DIAGNOSIS — I25.10 MILD CAD: Primary | ICD-10-CM

## 2018-01-03 DIAGNOSIS — R00.2 PALPITATIONS: ICD-10-CM

## 2018-01-03 PROCEDURE — 4004F PT TOBACCO SCREEN RCVD TLK: CPT | Performed by: CLINICAL NURSE SPECIALIST

## 2018-01-03 PROCEDURE — 4040F PNEUMOC VAC/ADMIN/RCVD: CPT | Performed by: CLINICAL NURSE SPECIALIST

## 2018-01-03 PROCEDURE — G8428 CUR MEDS NOT DOCUMENT: HCPCS | Performed by: CLINICAL NURSE SPECIALIST

## 2018-01-03 PROCEDURE — 1090F PRES/ABSN URINE INCON ASSESS: CPT | Performed by: CLINICAL NURSE SPECIALIST

## 2018-01-03 PROCEDURE — 1123F ACP DISCUSS/DSCN MKR DOCD: CPT | Performed by: CLINICAL NURSE SPECIALIST

## 2018-01-03 PROCEDURE — G8420 CALC BMI NORM PARAMETERS: HCPCS | Performed by: CLINICAL NURSE SPECIALIST

## 2018-01-03 PROCEDURE — 99213 OFFICE O/P EST LOW 20 MIN: CPT | Performed by: CLINICAL NURSE SPECIALIST

## 2018-01-03 PROCEDURE — G8484 FLU IMMUNIZE NO ADMIN: HCPCS | Performed by: CLINICAL NURSE SPECIALIST

## 2018-01-03 PROCEDURE — G8598 ASA/ANTIPLAT THER USED: HCPCS | Performed by: CLINICAL NURSE SPECIALIST

## 2018-01-03 PROCEDURE — G8400 PT W/DXA NO RESULTS DOC: HCPCS | Performed by: CLINICAL NURSE SPECIALIST

## 2018-01-03 ASSESSMENT — ENCOUNTER SYMPTOMS
VOMITING: 0
SHORTNESS OF BREATH: 1
BLURRED VISION: 0
NAUSEA: 0
HEARTBURN: 0
COUGH: 0
ORTHOPNEA: 0

## 2018-01-03 NOTE — PROGRESS NOTES
[facility], [address] [place] [address], [address] [id number]  Phone: [phone]  Fax: [phone]    OFFICE VISIT:  1/3/2018    Pam Olmos - : 1933    Reason For Visit:  Sergey Best is a 80 y.o. female who is here for follow-up for groin site infection and started new medication for palpitations    HPI  Patient is here for follow-up after cellulitis of her right groin after heart cath. She took a round of clindamycin and the cath site is now looking excellent without any sign of infection. There is no erythema, drainage. At last visit she was complaining of palpitations that sometimes occur in the morning lasting briefly. She wore a heart monitor that showed some very brief SVT area Toprol-XL 12.5 mg was started at bedtime. Since I saw the patient 2 weeks ago she ended up in the hospital with colitis. She has a long-term colostomy. During this hospital stay she was hypotensive and the Toprol was discontinued. She continues to have some intermittent palpitations but states they are not bothersome. Heart cath showed only mild CAD. Patient came to our office initially for cardiac risk stratification for an upcoming shoulder surgery which has been postponed    Nely Durant CNP is PCP.   Pam Olmos has the following history as recorded in [facility]:    Patient Active Problem List    Diagnosis Date Noted    Palpitations 2018    Mild CAD 2017    Cellulitis of right groin 2017    Abnormal stress echocardiogram     Precordial pain     Tobacco abuse 2017    Mixed hyperlipidemia 2017    Abnormal EKG 2017    Cervical disc disorder with radiculopathy 2017    Fatigue 2012    Chest pain     COPD (chronic obstructive pulmonary disease) (HCC)     Hiatal hernia     Shingles     Irregular heartbeat     GERD (gastroesophageal reflux disease)     Diverticulitis     Brain aneurysm     [facility]    brief SVT. We will leave her off metoprolol for the time being and she will let us know if she is having problems in the future. It is a long distance for her to come to our office and she would prefer to follow with her PCP. Her groin site completely healed and she may proceed with her shoulder surgery    Stable cardiovascular status. No evidence of overt heart failure, angina or dysrhythmia. Plan    Followup as needed  Call for palpitations    Call with any questions or concerns  Follow up with Jayden He CNP for non cardiac problems  Report any new problems  Cardiovascular Fitness-Exercise as tolerated. Strive for 15 minutes of exercise most days of the week. Cardiac / Healthy Diet  Continue current medications as directed  Continue plan of treatment  It is always recommended that you bring your medications bottles with you to each visit - this is for your safety!        GALEN Alfaro

## 2018-01-24 ENCOUNTER — OFFICE VISIT (OUTPATIENT)
Dept: NEUROSURGERY | Age: 83
End: 2018-01-24
Payer: MEDICARE

## 2018-01-24 VITALS
OXYGEN SATURATION: 98 % | SYSTOLIC BLOOD PRESSURE: 100 MMHG | WEIGHT: 133.2 LBS | HEART RATE: 66 BPM | DIASTOLIC BLOOD PRESSURE: 62 MMHG | BODY MASS INDEX: 22.19 KG/M2 | HEIGHT: 65 IN

## 2018-01-24 DIAGNOSIS — G89.29 CHRONIC LOW BACK PAIN WITH BILATERAL SCIATICA, UNSPECIFIED BACK PAIN LATERALITY: Primary | ICD-10-CM

## 2018-01-24 DIAGNOSIS — M54.41 CHRONIC LOW BACK PAIN WITH BILATERAL SCIATICA, UNSPECIFIED BACK PAIN LATERALITY: Primary | ICD-10-CM

## 2018-01-24 DIAGNOSIS — I67.1 ANEURYSM OF CAVERNOUS PORTION OF RIGHT INTERNAL CAROTID ARTERY: ICD-10-CM

## 2018-01-24 DIAGNOSIS — M54.42 CHRONIC LOW BACK PAIN WITH BILATERAL SCIATICA, UNSPECIFIED BACK PAIN LATERALITY: Primary | ICD-10-CM

## 2018-01-24 DIAGNOSIS — M54.2 NECK PAIN: ICD-10-CM

## 2018-01-24 DIAGNOSIS — G50.0 TRIGEMINAL NEURALGIA: ICD-10-CM

## 2018-01-24 PROCEDURE — G8420 CALC BMI NORM PARAMETERS: HCPCS | Performed by: PSYCHIATRY & NEUROLOGY

## 2018-01-24 PROCEDURE — G8400 PT W/DXA NO RESULTS DOC: HCPCS | Performed by: PSYCHIATRY & NEUROLOGY

## 2018-01-24 PROCEDURE — 4040F PNEUMOC VAC/ADMIN/RCVD: CPT | Performed by: PSYCHIATRY & NEUROLOGY

## 2018-01-24 PROCEDURE — 1090F PRES/ABSN URINE INCON ASSESS: CPT | Performed by: PSYCHIATRY & NEUROLOGY

## 2018-01-24 PROCEDURE — G8484 FLU IMMUNIZE NO ADMIN: HCPCS | Performed by: PSYCHIATRY & NEUROLOGY

## 2018-01-24 PROCEDURE — 4004F PT TOBACCO SCREEN RCVD TLK: CPT | Performed by: PSYCHIATRY & NEUROLOGY

## 2018-01-24 PROCEDURE — 1123F ACP DISCUSS/DSCN MKR DOCD: CPT | Performed by: PSYCHIATRY & NEUROLOGY

## 2018-01-24 PROCEDURE — G8598 ASA/ANTIPLAT THER USED: HCPCS | Performed by: PSYCHIATRY & NEUROLOGY

## 2018-01-24 PROCEDURE — 99214 OFFICE O/P EST MOD 30 MIN: CPT | Performed by: PSYCHIATRY & NEUROLOGY

## 2018-01-24 PROCEDURE — G8427 DOCREV CUR MEDS BY ELIG CLIN: HCPCS | Performed by: PSYCHIATRY & NEUROLOGY

## 2018-01-24 NOTE — PROGRESS NOTES
file       Current Outpatient Prescriptions   Medication Sig Dispense Refill    metoprolol succinate (TOPROL XL) 25 MG extended release tablet Take 0.5 tablets by mouth daily 30 tablet 5    FLUTICASONE FUROATE NA by Nasal route as needed      omeprazole (PRILOSEC) 20 MG delayed release capsule Take 20 mg by mouth 2 times daily      HYDROcodone-acetaminophen (NORCO) 7.5-325 MG per tablet Take 1 tablet by mouth every 8 hours as needed for Pain . Earliest Fill Date: 9/20/17 60 tablet 0    cyclobenzaprine (FLEXERIL) 10 MG tablet Take 1 tablet by mouth 3 times daily as needed for Muscle spasms 60 tablet 11    linaclotide (LINZESS) 145 MCG capsule Take 145 mcg by mouth every morning (before breakfast)      ipratropium-albuterol (DUONEB) 0.5-2.5 (3) MG/3ML SOLN nebulizer solution Inhale 1 vial into the lungs 4 times daily      Oxygen Concentrator Indications: uses 8-10 hrs nightly      albuterol (PROVENTIL HFA;VENTOLIN HFA) 108 (90 BASE) MCG/ACT inhaler Inhale 2 puffs into the lungs every 6 hours as needed.  potassium chloride (KLOR-CON) 10 MEQ CR tablet Take 10 mEq by mouth 2 times daily.  aspirin 325 MG EC tablet Take 325 mg by mouth daily.  salmeterol (SEREVENT DISKUS) 50 MCG/DOSE diskus inhaler Inhale 1 puff into the lungs 2 times daily.  indapamide (LOZOL) 2.5 MG tablet Take 2.5 mg by mouth every morning.  atorvastatin (LIPITOR) 40 MG tablet Take 40 mg by mouth daily. No current facility-administered medications for this visit.         Allergies   Allergen Reactions    Baclofen     Ibuprofen     Medrol [Methylprednisolone]     Neosporin [Neomycin-Bacitracin Zn-Polymyx]     Nsaids     Other      Muscle relaxers, bleach, spray chemicals, all insect bitesm,     Oxycontin [Oxycodone Hcl]     Pcn [Penicillins]     Soma [Carisoprodol]     Sulfa Antibiotics            REVIEW OF SYSTEMS    Constitutional: Negative for activity change, appetite change, chills, diaphoresis, fatigue, fever and unexpected weight change. HENT: Negative for congestion, dental problem, drooling, ear discharge, ear pain, facial swelling, mouth sores, nosebleeds, postnasal drip, rhinorrhea, sneezing, sore throat, tinnitus, trouble swallowing and voice change. Eyes: Negative for photophobia, pain, discharge, redness, itching and visual disturbance. Respiratory: Negative for apnea, notes some cough,  No choking, chest tightness, some shortness of breath noted, wheezing and stridor. Cardiovascular: Negative for chest pain, palpitations and leg swelling. Gastrointestinal: Negative for abdominal distention, abdominal pain, anal bleeding, blood in stool, diarrhea, nausea, rectal pain and vomiting. Does note some constipation. Endocrine: Negative for cold intolerance, heat intolerance, polydipsia, polyphagia and polyuria. Genitourinary: Negative for decreased urine volume, difficulty urinating, discharge, dysuria, enuresis, flank pain, frequency, genital sores, hematuria. Musculoskeletal: neck pain and back pain noted. Skin: Negative for color change, pallor, rash and wound. Allergic/Immunologic: Negative for environmental allergies, food allergies and immunocompromised state. Neurological: Negative for dizziness, tremors, seizures, syncope, facial asymmetry, speech difficulty, weakness, light-headedness and numbness. Hematological: Negative for adenopathy. Psychiatric/Behavioral: Negative for agitation, behavioral problems, confusion, decreased concentration, dysphoric mood, hallucinations, self-injury and suicidal ideas. All other review of systems are negative. PHYSICAL EXAM  /62   Pulse 66   Ht 5' 5\" (1.651 m)   Wt 133 lb 3.2 oz (60.4 kg)   SpO2 98%   BMI 22.17 kg/m²     Constitutional - well developed, well nourished.     Eyes - conjunctiva normal.  Pupils react to light  Ear, nose, throat -hearing intact to finger rub No scars, masses, or lesions over

## 2018-02-06 ENCOUNTER — TELEPHONE (OUTPATIENT)
Dept: CARDIOLOGY | Age: 83
End: 2018-02-06

## 2018-02-06 NOTE — TELEPHONE ENCOUNTER
Notification of Approved Cardiac Clearance    A surgical clearance request was initiated for this patient for the following procedure: Colonoscopy    After considering the patient's currently state of cardiac health, this patient has been Risk to move forward with the surgical procedure indicated. Please note a letter has been forwarded to the office of Dr. Yoly Holden to make them aware.

## 2018-05-03 ENCOUNTER — HOSPITAL ENCOUNTER (OUTPATIENT)
Dept: PAIN MANAGEMENT | Age: 83
Discharge: HOME | End: 2018-05-03
Payer: MEDICARE

## 2018-05-03 VITALS
DIASTOLIC BLOOD PRESSURE: 78 MMHG | SYSTOLIC BLOOD PRESSURE: 137 MMHG | BODY MASS INDEX: 21.83 KG/M2 | HEIGHT: 65 IN | WEIGHT: 131 LBS | OXYGEN SATURATION: 96 % | TEMPERATURE: 96.9 F | RESPIRATION RATE: 18 BRPM | HEART RATE: 79 BPM

## 2018-05-03 DIAGNOSIS — M50.10 CERVICAL DISC DISORDER WITH RADICULOPATHY: ICD-10-CM

## 2018-05-03 PROCEDURE — 99212 OFFICE O/P EST SF 10 MIN: CPT

## 2018-05-03 RX ORDER — HYDROCODONE BITARTRATE AND ACETAMINOPHEN 7.5; 325 MG/1; MG/1
1 TABLET ORAL DAILY PRN
Qty: 30 TABLET | Refills: 0 | Status: SHIPPED | OUTPATIENT
Start: 2018-05-03 | End: 2018-07-24 | Stop reason: ALTCHOICE

## 2018-05-03 ASSESSMENT — PAIN DESCRIPTION - PAIN TYPE: TYPE: CHRONIC PAIN

## 2018-05-03 ASSESSMENT — ACTIVITIES OF DAILY LIVING (ADL): EFFECT OF PAIN ON DAILY ACTIVITIES: LIMITS ACTIVITIES

## 2018-05-03 ASSESSMENT — PAIN DESCRIPTION - ORIENTATION: ORIENTATION: LEFT

## 2018-05-03 ASSESSMENT — PAIN DESCRIPTION - DESCRIPTORS: DESCRIPTORS: ACHING;CONSTANT;RADIATING;SHARP

## 2018-05-03 ASSESSMENT — PAIN DESCRIPTION - ONSET: ONSET: ON-GOING

## 2018-05-03 ASSESSMENT — PAIN DESCRIPTION - PROGRESSION: CLINICAL_PROGRESSION: NOT CHANGED

## 2018-05-03 ASSESSMENT — PAIN DESCRIPTION - LOCATION: LOCATION: SHOULDER;ANKLE

## 2018-05-03 ASSESSMENT — PAIN DESCRIPTION - FREQUENCY: FREQUENCY: CONTINUOUS

## 2018-05-03 ASSESSMENT — PAIN SCALES - GENERAL: PAINLEVEL_OUTOF10: 4

## 2018-05-30 NOTE — TELEPHONE ENCOUNTER
Patient is wanting to know what time to be here for heart cath tomorrow.   States she was advised to arrive at [facility] and daughter states she was told to arrive at 6AM.  Please advise weight-bearing as tolerated

## 2018-07-24 ENCOUNTER — OFFICE VISIT (OUTPATIENT)
Dept: NEUROSURGERY | Age: 83
End: 2018-07-24
Payer: MEDICARE

## 2018-07-24 VITALS
HEART RATE: 52 BPM | DIASTOLIC BLOOD PRESSURE: 64 MMHG | BODY MASS INDEX: 21.19 KG/M2 | SYSTOLIC BLOOD PRESSURE: 118 MMHG | OXYGEN SATURATION: 92 % | HEIGHT: 65 IN | WEIGHT: 127.2 LBS

## 2018-07-24 DIAGNOSIS — M54.41 CHRONIC LOW BACK PAIN WITH BILATERAL SCIATICA, UNSPECIFIED BACK PAIN LATERALITY: Primary | ICD-10-CM

## 2018-07-24 DIAGNOSIS — G89.29 CHRONIC LOW BACK PAIN WITH BILATERAL SCIATICA, UNSPECIFIED BACK PAIN LATERALITY: Primary | ICD-10-CM

## 2018-07-24 DIAGNOSIS — R20.2 NUMBNESS AND TINGLING OF BOTH LEGS: ICD-10-CM

## 2018-07-24 DIAGNOSIS — M54.42 CHRONIC LOW BACK PAIN WITH BILATERAL SCIATICA, UNSPECIFIED BACK PAIN LATERALITY: Primary | ICD-10-CM

## 2018-07-24 DIAGNOSIS — R20.0 NUMBNESS AND TINGLING OF BOTH LEGS: ICD-10-CM

## 2018-07-24 DIAGNOSIS — M54.2 NECK PAIN: ICD-10-CM

## 2018-07-24 PROCEDURE — 99213 OFFICE O/P EST LOW 20 MIN: CPT | Performed by: NURSE PRACTITIONER

## 2018-07-24 RX ORDER — FERROUS SULFATE 300 MG/5ML
300 LIQUID (ML) ORAL DAILY
COMMUNITY
End: 2018-10-31 | Stop reason: ALTCHOICE

## 2018-07-24 NOTE — PROGRESS NOTES
[facility] Neurology Office Note      Patient:   Wale Garcia  MR#:    [id number]  Account Number:                         YOB: 1933  Date of Evaluation:  7/24/2018  Time of Note:                          9:13 AM  Primary/Referring Physician:  GALEN Jara - CNP   Consulting Physician:  GALEN Gentile    FOLLOW UP VISIT    Chief Complaint   Patient presents with    Pain     6 month follow up Had a broken hip     Fall     Patient stated that she has fallen twice since she has been     Numbness     Feet and legs       HISTORY OF PRESENT ILLNESS    Wale Garcia is a 80y.o. year old female here for follow up. Overall, doing about the same. She reports she fell last month that resulted in a hip fracture. Had surgery for this, went to swing bed for rehab, back home now. She has fallen since arriving back home, relates it to the numbness in bilateral feet, denies pain. Still in pain management for hip pain and sciatica, seems to be helping. Off muscle relaxers and pain medication since hospitalization. Seeing orthopedic for shoulder pain, getting injections for this. History of ICA aneurysm, followed at [facility] for this. Facial pain is better, no longer on Tegretol. She thinks some of the pain is related to her teeth. No other complaints today.       Past Medical History:   Diagnosis Date    Abnormal EKG 11/14/2017    Angina     Arthritis     Asthma     Atherosclerosis     Brain aneurysm     with a coil    Bulging disc     Cellulitis of right groin 12/11/2017    Chest pain     COPD (chronic obstructive pulmonary disease) (HCC)     Diverticulitis     GERD (gastroesophageal reflux disease)     has colostomy bag    Hiatal hernia     Irregular heartbeat     Irritable bowel syndrome     Mild CAD 12/11/2017    Mixed hyperlipidemia 11/14/2017    Osteoporosis     Palpitations 1/3/2018    Rheumatoid arthritis(714.0)     Scoliosis     Shingles     Spinal stenosis for shoulder surgery and pain management for neck/back pain. She noted improvement with pain after injections. Continue to follow with [facility] for secured ICA aneurysm. No clear evidence of trigeminal neuralgia on exam today, continue to follow clinically. May consider pain cream from [facility] for any worsening neuropathy symptoms. ICD-10-CM ICD-9-CM    1. Chronic low back pain with bilateral sciatica, unspecified back pain laterality M54.41 724.2     G89.29 338.29     M54.42     2. Neck pain M54.2 723.1    3. Numbness and tingling of both legs R20.0 782.0     R20.2       PLAN:  1. Continue to follow clinically   2. Continue with pain management and ortho follow up as previously scheduled   3. Fall precautions discussed   4. Return in about 6 months (around 1/24/2019) for follow up, sooner if worsening. GALEN Conroy    Note:  A total of >50% (>8 minutes) of 15 minutes was spent discussing the pathophysiology and treatment and/or coordination of care of the above diagnoses.

## 2018-07-24 NOTE — PROGRESS NOTES
REVIEW OF SYSTEMS    Constitutional: []Fever []Sweat []Chills [x] Recent Injury [x] Denies all unless marked  HEENT:[x]Headache  [x] Head Injury/Hearing Loss  [] Sore Throat  [] Ear Ache/Dizziness  [x] Denies all unless marked  Spine:  [x] Neck pain  [] Back pain  [] Sciaticia  [x] Denies all unless marked  Cardiovascular:[]Heart Disease [x]Chest Pain [] Palpitations  [x] Denies all unless marked  Pulmonary: [x]Shortness of Breath [x]Cough   [x] Denies all unless marke  Gastrointestinal: []Nausea  []Vomiting  [x]Abdominal Pain  []Constipation  [x]Diarrhea  []Dark Bloody Stools  [x] Denies all unless marked  Psychiatric/Behavioral:[] Depression [] Anxiety [x] Denies all unless marked  Genitourinary:   [x] Frequency  [] Urgency  [] Incontinence [] Pain with Urination  [x] Denies all unless marked  Extremities: [x]Pain  []Swelling  [x] Denies all unless marked  Musculoskeletal: [x] Muscle Pain  [x] Joint Pain  [] Arthritis [] Muscle Cramps [] Muscle Twitches  [x] Denies all unless marked  Sleep: [x] Insomnia [] Snoring [] Restless Legs [] Sleep Apnea  [] Daytime Sleepiness  [x] Denies all unless marked  Skin:[] Rash [] Skin Discoloration [x] Denies all unless marked   Neurological: []Visual Disturbance/Memory Loss [x] Loss of Balance [x] Slurred Speech/Weakness [] Seizures  [x] Vertigo/Dizziness [x] Denies all unless marked

## 2018-07-26 ENCOUNTER — HOSPITAL ENCOUNTER (OUTPATIENT)
Dept: PAIN MANAGEMENT | Age: 83
Discharge: HOME | End: 2018-07-26
Payer: MEDICARE

## 2018-07-26 VITALS
RESPIRATION RATE: 18 BRPM | BODY MASS INDEX: 21.16 KG/M2 | HEIGHT: 65 IN | OXYGEN SATURATION: 99 % | WEIGHT: 127 LBS | TEMPERATURE: 97 F | DIASTOLIC BLOOD PRESSURE: 54 MMHG | SYSTOLIC BLOOD PRESSURE: 128 MMHG | HEART RATE: 81 BPM

## 2018-07-26 DIAGNOSIS — M50.10 CERVICAL DISC DISORDER WITH RADICULOPATHY: ICD-10-CM

## 2018-07-26 DIAGNOSIS — M54.12 CERVICAL NEURALGIA: ICD-10-CM

## 2018-07-26 PROCEDURE — 3209999900 FLUORO FOR SURGICAL PROCEDURES

## 2018-07-26 PROCEDURE — 62321 NJX INTERLAMINAR CRV/THRC: CPT

## 2018-07-26 PROCEDURE — 2580000003 HC RX 258

## 2018-07-26 PROCEDURE — 6360000002 HC RX W HCPCS

## 2018-07-26 PROCEDURE — 2500000003 HC RX 250 WO HCPCS

## 2018-07-26 RX ORDER — 0.9 % SODIUM CHLORIDE 0.9 %
VIAL (ML) INJECTION
Status: COMPLETED | OUTPATIENT
Start: 2018-07-26 | End: 2018-07-26

## 2018-07-26 RX ORDER — LIDOCAINE HYDROCHLORIDE 10 MG/ML
INJECTION, SOLUTION EPIDURAL; INFILTRATION; INTRACAUDAL; PERINEURAL
Status: COMPLETED | OUTPATIENT
Start: 2018-07-26 | End: 2018-07-26

## 2018-07-26 RX ORDER — BUPIVACAINE HYDROCHLORIDE 2.5 MG/ML
INJECTION, SOLUTION EPIDURAL; INFILTRATION; INTRACAUDAL
Status: COMPLETED | OUTPATIENT
Start: 2018-07-26 | End: 2018-07-26

## 2018-07-26 RX ORDER — METHYLPREDNISOLONE ACETATE 80 MG/ML
INJECTION, SUSPENSION INTRA-ARTICULAR; INTRALESIONAL; INTRAMUSCULAR; SOFT TISSUE
Status: COMPLETED | OUTPATIENT
Start: 2018-07-26 | End: 2018-07-26

## 2018-07-26 RX ADMIN — BUPIVACAINE HYDROCHLORIDE 2.5 ML: 2.5 INJECTION, SOLUTION EPIDURAL; INFILTRATION; INTRACAUDAL at 08:42

## 2018-07-26 RX ADMIN — Medication 1.5 ML: at 08:42

## 2018-07-26 RX ADMIN — LIDOCAINE HYDROCHLORIDE 3 ML: 10 INJECTION, SOLUTION EPIDURAL; INFILTRATION; INTRACAUDAL; PERINEURAL at 08:41

## 2018-07-26 RX ADMIN — METHYLPREDNISOLONE ACETATE 80 MG: 80 INJECTION, SUSPENSION INTRA-ARTICULAR; INTRALESIONAL; INTRAMUSCULAR; SOFT TISSUE at 08:42

## 2018-07-26 ASSESSMENT — ACTIVITIES OF DAILY LIVING (ADL): EFFECT OF PAIN ON DAILY ACTIVITIES: DAILY CHORES AND ACTIVITIES

## 2018-07-26 ASSESSMENT — PAIN - FUNCTIONAL ASSESSMENT
PAIN_FUNCTIONAL_ASSESSMENT: 0-10
PAIN_FUNCTIONAL_ASSESSMENT: 0-10

## 2018-07-26 ASSESSMENT — PAIN DESCRIPTION - DESCRIPTORS: DESCRIPTORS: ACHING;CONSTANT;RADIATING;THROBBING;NUMBNESS;TINGLING

## 2018-07-26 NOTE — PROGRESS NOTES
Procedure:  Level of Consciousness: [x]Alert [x]Oriented []Disoriented []Lethargic  Anxiety Level: [x]Calm []Anxious []Depressed []Other  Skin: [x]Warm [x]Dry []Cool []Moist []Intact []Other  Cardiovascular: []Palpitations: [x]Never []Occasionally []Frequently  Chest Pain: [x]No []Yes  Respiratory:  [x]Unlabored []Labored []Cough ([] Productive []Unproductive)  HCG Required: [x]No []Yes   Results: []Negative []Positive  Knowledge Level:        [x]Patient/Other verbalized understanding of pre-procedure instructions. [x]Assessment of post-op care needs (transportation, responsible caregiver)        [x]Able to discuss health care problems and how to deal with it.   Factors that Affect Teaching:        Language Barrier: [x]No []Yes - why:        Hearing Loss:        [x]No []Yes            Corrective Device:  []Yes []No        Vision Loss:           []No [x]Yes            Corrective Device:  [x]Yes []No        Memory Loss:       [x]No []Yes            []Short Term []Long Term  Motivational Level:  [x]Asks Questions                  []Extremely Anxious       [x]Seems Interested               []Seems Uninterested                  [x]Denies need for Education  Risk for Injury:  [x]Patient oriented to person, place and time  [x]History of frequent falls/loss of balance  Nutritional:  []Change in appetite   []Weight Gain   []Weight Loss  Functional:  []Requires assistance with ADL's

## 2018-07-26 NOTE — PROCEDURES
Depo Medrol was gently injected. There were no complications. DIAGNOSIS  [x] *Cervical Degenerative Disc Disease    [x] *Cervical Radiculopathy  [] *Cervical Spinal Stenosis                       [] Other     PLAN  [x]  Will return to office in 3 month(s) [x] Planned Procedure [] Office Visit  []  Prescriptions given today          [] No prescriptions needed today  []  Patient is to call with any questions or concerns which may arise prior to the next office visit. COMMENTS          [] Note: Over 50% of today's appointment was given to discussion, evaluation and counseling.          MD Rakesh Cho MD  7/26/2018

## 2018-08-24 ENCOUNTER — OFFICE VISIT (OUTPATIENT)
Dept: PODIATRY | Facility: CLINIC | Age: 83
End: 2018-08-24

## 2018-08-24 VITALS — WEIGHT: 125 LBS | HEART RATE: 82 BPM | BODY MASS INDEX: 20.83 KG/M2 | HEIGHT: 65 IN | OXYGEN SATURATION: 97 %

## 2018-08-24 DIAGNOSIS — B35.1 ONYCHOMYCOSIS: ICD-10-CM

## 2018-08-24 DIAGNOSIS — G89.29 CHRONIC PAIN OF TOE OF RIGHT FOOT: ICD-10-CM

## 2018-08-24 DIAGNOSIS — R09.89 DECREASED PEDAL PULSES: ICD-10-CM

## 2018-08-24 DIAGNOSIS — M79.675 CHRONIC PAIN OF TOE OF LEFT FOOT: Primary | ICD-10-CM

## 2018-08-24 DIAGNOSIS — M79.674 CHRONIC PAIN OF TOE OF RIGHT FOOT: ICD-10-CM

## 2018-08-24 DIAGNOSIS — G89.29 CHRONIC PAIN OF TOE OF LEFT FOOT: Primary | ICD-10-CM

## 2018-08-24 PROCEDURE — 99203 OFFICE O/P NEW LOW 30 MIN: CPT | Performed by: PODIATRIST

## 2018-08-24 PROCEDURE — 11721 DEBRIDE NAIL 6 OR MORE: CPT | Performed by: PODIATRIST

## 2018-08-24 RX ORDER — FERROUS SULFATE TAB EC 324 MG (65 MG FE EQUIVALENT) 324 (65 FE) MG
324 TABLET DELAYED RESPONSE ORAL
COMMUNITY
End: 2019-01-01

## 2018-08-24 RX ORDER — OMEPRAZOLE 20 MG/1
20 CAPSULE, DELAYED RELEASE ORAL DAILY
COMMUNITY
End: 2019-01-01

## 2018-08-24 RX ORDER — FLUTICASONE PROPIONATE 50 MCG
2 SPRAY, SUSPENSION (ML) NASAL DAILY
COMMUNITY
End: 2019-01-01

## 2018-08-24 RX ORDER — ASPIRIN 81 MG/1
81 TABLET ORAL DAILY
COMMUNITY

## 2018-08-24 RX ORDER — FAMOTIDINE 20 MG/1
20 TABLET, FILM COATED ORAL 2 TIMES DAILY
COMMUNITY
End: 2019-01-01

## 2018-08-24 NOTE — PROGRESS NOTES
Shirley Bamforth  12/25/1933  84 y.o. female     Patient presents today with a request of nail care.    08/24/2018    Chief Complaint   Patient presents with   • Left Foot - nail care, Bunions   • Right Foot - nail care, Bunions       History of Present Illness    Shirley Bamforth is a 84 y.o.female who presents to clinic with chief complaint of toe pain.  Pain is primarily located to the toenails.  Patient states that they are thick and long and rubbing her shoe gear.  She rates the pain as a 3 out of 10.  She describes it as sharp.  Pain affects her ambulation.  She has done nothing to treat the pain.  She also complains of cold toes.  She denies any injuries or trauma to the feet.  She has no other pedal complaints.      Past Medical History:   Diagnosis Date   • Asthma    • Brain aneurysm     Does have endovascular coiling this is MRI compatible   • Cancer, uterine (CMS/HCC)    • COPD (chronic obstructive pulmonary disease) (CMS/HCC)    • Diverticulitis    • Dyspnea    • GERD (gastroesophageal reflux disease)    • Ingrown toenail    • Osteoporosis    • Rheumatoid arthritis (CMS/HCC)    • Shingles (herpes zoster) polyneuropathy          Past Surgical History:   Procedure Laterality Date   • APPENDECTOMY     • CATARACT EXTRACTION EXTRACAPSULAR W/ INTRAOCULAR LENS IMPLANTATION Bilateral    • CEREBRAL ANEURYSM REPAIR     • COLOSTOMY     • HERNIA REPAIR     • HYSTERECTOMY     • SHOULDER SURGERY Right    • TONSILLECTOMY AND ADENOIDECTOMY           Family History   Problem Relation Age of Onset   • Arthritis Mother    • Cancer Mother    • Diabetes Mother    • Hypertension Mother    • Diabetes Brother    • Heart disease Brother    • Hypertension Brother    • Cancer Brother    • Cancer Daughter        Allergies   Allergen Reactions   • Nsaids    • Oxycontin [Oxycodone Hcl]    • Penicillins    • Sulfa Antibiotics    • Adhesive Tape Rash   • Baclofen Rash and Confusion   • Soma [Carisoprodol] Rash       Social History      Social History   • Marital status: Single     Spouse name: N/A   • Number of children: N/A   • Years of education: N/A     Occupational History   • Not on file.     Social History Main Topics   • Smoking status: Former Smoker     Packs/day: 1.00     Types: Cigarettes   • Smokeless tobacco: Never Used      Comment: Trying to cut down   • Alcohol use No   • Drug use: No   • Sexual activity: Defer     Other Topics Concern   • Not on file     Social History Narrative   • No narrative on file         Current Outpatient Prescriptions   Medication Sig Dispense Refill   • albuterol (PROVENTIL HFA;VENTOLIN HFA) 108 (90 BASE) MCG/ACT inhaler Inhale 2 puffs Every 4 (Four) Hours As Needed for wheezing.     • albuterol (PROVENTIL HFA;VENTOLIN HFA) 108 (90 BASE) MCG/ACT inhaler Every 6 (Six) Hours.     • aspirin 325 MG tablet Take 325 mg by mouth Daily.     • atorvastatin (LIPITOR) 40 MG tablet Take 40 mg by mouth daily.       • famotidine (PEPCID) 20 MG tablet Take 20 mg by mouth 2 (Two) Times a Day.     • ferrous sulfate 324 (65 Fe) MG tablet delayed-release EC tablet Take 324 mg by mouth Daily With Breakfast.     • fluticasone (FLONASE) 50 MCG/ACT nasal spray 2 sprays into the nostril(s) as directed by provider Daily.     • indapamide (LOZOL) 2.5 MG tablet Take 2.5 mg by mouth every morning.       • ipratropium-albuterol (DUO-NEB) 0.5-2.5 mg/mL nebulizer Take 3 mL by nebulization Every 4 (Four) Hours As Needed for wheezing.     • ipratropium-albuterol (DUO-NEB) 0.5-2.5 mg/mL nebulizer Inhale 1 vial into the lungs 4 times daily     • Linaclotide (LINZESS) 145 MCG capsule Take 145 mcg by mouth every morning (before breakfast)     • O2 (OXYGEN) Indications: uses 8-10 hrs nightly     • omeprazole (priLOSEC) 20 MG capsule Take 20 mg by mouth Daily.     • potassium chloride (K-DUR) 10 MEQ CR tablet Take 10 mEq by mouth 2 (Two) Times a Day.     • POTASSIUM PO Take 10 mEq by mouth 2 times daily.       • salmeterol (SEREVENT  "DISKUS) 50 MCG/DOSE diskus inhaler Inhale 1 puff into the lungs 2 times daily.         No current facility-administered medications for this visit.        Review of Systems   Constitutional: Positive for activity change, appetite change and fatigue.   HENT: Positive for hearing loss.    Eyes: Positive for visual disturbance.   Respiratory: Positive for cough, chest tightness, shortness of breath and wheezing.    Cardiovascular: Positive for chest pain and leg swelling.   Gastrointestinal: Positive for abdominal pain and diarrhea.   Endocrine: Positive for cold intolerance and heat intolerance.   Genitourinary: Positive for enuresis and frequency.   Musculoskeletal: Positive for arthralgias and joint swelling.        Foot pain  Ankle pain  Joint pain   Neurological: Positive for dizziness, numbness and headaches.   Psychiatric/Behavioral: Negative.          OBJECTIVE    Pulse 82   Ht 165.1 cm (65\")   Wt 56.7 kg (125 lb)   SpO2 97%   BMI 20.80 kg/m²       Physical Exam   Constitutional: She is oriented to person, place, and time. She appears well-developed and well-nourished.   HENT:   Head: Normocephalic and atraumatic.   Nose: Nose normal.   Eyes: Pupils are equal, round, and reactive to light. Conjunctivae and EOM are normal.   Pulmonary/Chest: Effort normal. No respiratory distress. She has no wheezes.   Neurological: She is alert and oriented to person, place, and time. She displays normal reflexes.   Skin: She is not diaphoretic.   Psychiatric: She has a normal mood and affect. Her behavior is normal.   Vitals reviewed.        Lower Extremity    Cardiovascular:    DP/PT pulses faintly palpable bilateral   CFT intact to all digits  Skin temp is warm to cold from proximal tibia to distal digits bilateral  Pedal hair growth diminished    No erythema or edema noted     Musculoskeletal:  Muscle strength is 5/5 for all muscle groups tested   ROM of the 1st MTP is full without pain or crepitus bilateral  Mild HAV " bilateral   ROM of the MTJ is full without pain or crepitus  bilateral  ROM of the STJ is full without pain or crepitus bilateral   ROM of the ankle joint is full without pain or crepitus  bilateral    Dermatological:   Nails 1-5 bilateral are thickened, discolored, elongated with subungual debris.  Pain on palpation to the nail plates   Skin is warm, dry and intact bilateral  Webspaces 1-4 bilateral are clean, dry and intact.   No subcutaneous nodules or masses noted      Neurological:   Protective sensation intact   Sensation intact to light touch        Procedures        ASSESSMENT AND PLAN    Marii was seen today for nail care, bunions, nail care and bunions.    Diagnoses and all orders for this visit:    Chronic pain of toe of left foot    Chronic pain of toe of right foot    Onychomycosis    Decreased pedal pulses  -     Doppler Ankle Brachial Index Single Level CAR; Future      - Comprehensive foot and ankle exam performed.   - Diagnosis and treatment of fungal toenails was discussed with the patient including nail biopsy and treatment with oral antifungals versus avulsions both temporary and permanent versus regular debridements.  Patient elected for routine nail debridement  - Nails 1-5 bilateral were debrided in length and thickness with nail nipper and electric  to decrease fungal load and risk of infection.   - Ordered noninvasive vascular testing  - All questions were answered to the patients satisfaction.  - RTC 3 months as needed          This document has been electronically signed by Jose R Dewitt DPM on August 24, 2018 12:02 PM     8/24/2018  12:02 PM

## 2018-09-13 ENCOUNTER — HOSPITAL ENCOUNTER (OUTPATIENT)
Dept: PAIN MANAGEMENT | Age: 83
Discharge: HOME | End: 2018-09-13
Payer: MEDICARE

## 2018-09-13 VITALS
BODY MASS INDEX: 21.83 KG/M2 | DIASTOLIC BLOOD PRESSURE: 64 MMHG | SYSTOLIC BLOOD PRESSURE: 122 MMHG | OXYGEN SATURATION: 90 % | TEMPERATURE: 97.2 F | HEIGHT: 65 IN | RESPIRATION RATE: 20 BRPM | HEART RATE: 85 BPM | WEIGHT: 131 LBS

## 2018-09-13 DIAGNOSIS — M79.18 MYOFACIAL MUSCLE PAIN: ICD-10-CM

## 2018-09-13 DIAGNOSIS — G89.29 CHRONIC LEFT SHOULDER PAIN: ICD-10-CM

## 2018-09-13 DIAGNOSIS — M25.512 CHRONIC LEFT SHOULDER PAIN: ICD-10-CM

## 2018-09-13 DIAGNOSIS — M50.10 CERVICAL DISC DISORDER WITH RADICULOPATHY: ICD-10-CM

## 2018-09-13 PROCEDURE — 99213 OFFICE O/P EST LOW 20 MIN: CPT | Performed by: NURSE PRACTITIONER

## 2018-09-13 PROCEDURE — 99214 OFFICE O/P EST MOD 30 MIN: CPT

## 2018-09-13 ASSESSMENT — PAIN DESCRIPTION - ORIENTATION: ORIENTATION: LEFT;UPPER

## 2018-09-13 ASSESSMENT — ENCOUNTER SYMPTOMS
CONSTIPATION: 0
SHORTNESS OF BREATH: 0
SORE THROAT: 0
WHEEZING: 0
BLURRED VISION: 0

## 2018-09-13 ASSESSMENT — PAIN DESCRIPTION - PAIN TYPE: TYPE: CHRONIC PAIN

## 2018-09-13 ASSESSMENT — PAIN DESCRIPTION - FREQUENCY: FREQUENCY: CONTINUOUS

## 2018-09-13 ASSESSMENT — PAIN DESCRIPTION - LOCATION: LOCATION: SHOULDER;NECK;HIP

## 2018-09-13 ASSESSMENT — PAIN DESCRIPTION - ONSET: ONSET: ON-GOING

## 2018-09-13 ASSESSMENT — PAIN SCALES - GENERAL: PAINLEVEL_OUTOF10: 10

## 2018-09-13 ASSESSMENT — PAIN DESCRIPTION - DESCRIPTORS: DESCRIPTORS: ACHING;CONSTANT;RADIATING;NUMBNESS;TINGLING

## 2018-09-13 ASSESSMENT — PAIN DESCRIPTION - PROGRESSION: CLINICAL_PROGRESSION: NOT CHANGED
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
## 2018-09-13 NOTE — PROGRESS NOTES
Nursing Admission Record    Current Issues / Falls / ER Visits:  no    Percentage of Pain Relief after Last Procedure:  <50 %    How long lasted:  1.5 days    Radiology exams received during the last 12 months: No       When na                                              Where na       Imaging on chart: Yes         Imaging records requested: No  MRI exams received in the past 2 years:  Yes  Physical therapy during the last 6 months: Yes Home Health physical therapy       When: Currently 2 times a week                                             Where Home  Labs during the last 12 months: Yes    Education Provided:  [x] Review of Watt Chum  [x] Agreement Review  [] Compliance Issues Discussed    [] Cognitive Behavior Needs [x] Exercise [] Review of Test [] Financial Issues  [x] Tobacco/Alcohol Use [x] Teaching [] New Patient [] Picture Obtained    Physician Plan:  [] Outgoing Referral  [] Pharmacy Consult  [] Test Ordered   [] Obtained Test Results / Consult Notes  [] UDS due at next visit, verified per EPIC      [] Suspected Physical Abuse or Suicide Risk assessed - IF YES COMPLETE QUESTIONS BELOW    If any of the following questions are answered yes - contact attending physician for referral:    Has been considering harming self to escape stress, pain problems? [] YES  [] NO  Has a suicide plan? [] YES  [] NO  Has attempted suicide in the past?   [] YES  [] NO  Has a close friend or family member who committed suicide? [] YES  [] NO    Patient Referred To :      Additional Notes:    Assessment Completed by:  Electronically signed by Matt Gould RN on 9/13/2018 at 9:10 AM

## 2018-09-28 ENCOUNTER — OFFICE VISIT (OUTPATIENT)
Dept: PODIATRY | Facility: CLINIC | Age: 83
End: 2018-09-28

## 2018-09-28 VITALS — HEART RATE: 82 BPM | OXYGEN SATURATION: 97 % | WEIGHT: 125 LBS | HEIGHT: 65 IN | BODY MASS INDEX: 20.83 KG/M2

## 2018-09-28 DIAGNOSIS — M79.672 FOOT PAIN, BILATERAL: Primary | ICD-10-CM

## 2018-09-28 DIAGNOSIS — M21.41 PES PLANUS OF BOTH FEET: ICD-10-CM

## 2018-09-28 DIAGNOSIS — R60.0 LOWER EXTREMITY EDEMA: ICD-10-CM

## 2018-09-28 DIAGNOSIS — M79.671 FOOT PAIN, BILATERAL: Primary | ICD-10-CM

## 2018-09-28 DIAGNOSIS — M21.42 PES PLANUS OF BOTH FEET: ICD-10-CM

## 2018-09-28 PROCEDURE — 99213 OFFICE O/P EST LOW 20 MIN: CPT | Performed by: PODIATRIST

## 2018-09-28 RX ORDER — ALBUTEROL SULFATE 90 UG/1
AEROSOL, METERED RESPIRATORY (INHALATION)
COMMUNITY
End: 2019-01-01

## 2018-09-28 RX ORDER — INDAPAMIDE 2.5 MG/1
TABLET, FILM COATED ORAL EVERY 24 HOURS
COMMUNITY

## 2018-09-28 RX ORDER — IPRATROPIUM BROMIDE AND ALBUTEROL SULFATE 2.5; .5 MG/3ML; MG/3ML
SOLUTION RESPIRATORY (INHALATION) EVERY 6 HOURS SCHEDULED
COMMUNITY

## 2018-09-28 RX ORDER — OMEPRAZOLE 20 MG/1
CAPSULE, DELAYED RELEASE ORAL EVERY 24 HOURS
COMMUNITY

## 2018-09-28 RX ORDER — MELOXICAM 15 MG/1
TABLET ORAL EVERY 24 HOURS
COMMUNITY
End: 2019-01-01

## 2018-09-28 RX ORDER — POTASSIUM CHLORIDE 750 MG/1
TABLET, EXTENDED RELEASE ORAL
COMMUNITY
End: 2019-01-01

## 2018-09-28 RX ORDER — FLUTICASONE PROPIONATE 50 MCG
SPRAY, SUSPENSION (ML) NASAL EVERY 24 HOURS
COMMUNITY
End: 2019-01-01

## 2018-09-28 NOTE — PROGRESS NOTES
Shirley Bamforth  12/25/1933  84 y.o. female     Patient presents today for HECTOR results. She states she is having a lot of pain and swelling in her lower limbs/feet.    09/28/2018     Chief Complaint   Patient presents with   • Left Foot - HECTOR results, Edema, Pain   • Right Foot - Pain, Edema, HECTOR results       History of Present Illness    Patient presents to clinic for her vascular study results.  She continues complaining of pain and swelling to both of her lower extremities.  She denies any other pedal complaints.      Past Medical History:   Diagnosis Date   • Asthma    • Bilateral foot pain    • Brain aneurysm     Does have endovascular coiling this is MRI compatible   • Cancer, uterine (CMS/HCC)    • COPD (chronic obstructive pulmonary disease) (CMS/HCC)    • Diverticulitis    • Dyspnea    • GERD (gastroesophageal reflux disease)    • Ingrown toenail    • Osteoporosis    • Rheumatoid arthritis (CMS/HCC)    • Shingles (herpes zoster) polyneuropathy          Past Surgical History:   Procedure Laterality Date   • APPENDECTOMY     • CATARACT EXTRACTION EXTRACAPSULAR W/ INTRAOCULAR LENS IMPLANTATION Bilateral    • CEREBRAL ANEURYSM REPAIR     • COLOSTOMY     • HERNIA REPAIR     • HYSTERECTOMY     • SHOULDER SURGERY Right    • TONSILLECTOMY AND ADENOIDECTOMY           Family History   Problem Relation Age of Onset   • Arthritis Mother    • Cancer Mother    • Diabetes Mother    • Hypertension Mother    • Diabetes Brother    • Heart disease Brother    • Hypertension Brother    • Cancer Brother    • Cancer Daughter        Allergies   Allergen Reactions   • Aleve  [Naproxen Sodium] Unknown (See Comments)   • Aspirin Unknown (See Comments)   • Azithromycin Unknown (See Comments)   • Ibuprofen Swelling   • Naproxen Unknown (See Comments)   • Neomycin Itching   • Neosporin  [Neomycin-Bacitracin Zn-Polymyx] Unknown (See Comments)   • Oxycontin [Oxycodone Hcl] Unknown (See Comments)   • Penicillin G Unknown (See Comments)    • Penicillins    • Piroxicam Itching   • Polymyxin B Itching   • Sodium Hypochlorite Unknown (See Comments)   • Sulfa Antibiotics    • Tramadol Itching   • Adhesive Tape Rash   • Baclofen Rash and Confusion   • Nsaids Rash and Unknown (See Comments)     Motrin,naprosyn   • Soma [Carisoprodol] Rash and Unknown (See Comments)       Social History     Social History   • Marital status: Single     Spouse name: N/A   • Number of children: N/A   • Years of education: N/A     Occupational History   • Not on file.     Social History Main Topics   • Smoking status: Current Every Day Smoker     Packs/day: 1.00     Types: Cigarettes   • Smokeless tobacco: Never Used      Comment: Trying to cut down   • Alcohol use No   • Drug use: No   • Sexual activity: Defer     Other Topics Concern   • Not on file     Social History Narrative   • No narrative on file         Current Outpatient Prescriptions   Medication Sig Dispense Refill   • albuterol (PROAIR HFA) 108 (90 Base) MCG/ACT inhaler INHALE 2 PUFFS BY INHALATION FOUR TIMES DAILY AS NEEDED     • albuterol (PROVENTIL HFA;VENTOLIN HFA) 108 (90 BASE) MCG/ACT inhaler Inhale 2 puffs Every 4 (Four) Hours As Needed for wheezing.     • albuterol (PROVENTIL HFA;VENTOLIN HFA) 108 (90 BASE) MCG/ACT inhaler Every 6 (Six) Hours.     • aspirin 325 MG tablet Take 325 mg by mouth Daily.     • atorvastatin (LIPITOR) 40 MG tablet Take 40 mg by mouth daily.       • famotidine (PEPCID) 20 MG tablet Take 20 mg by mouth 2 (Two) Times a Day.     • ferrous sulfate 324 (65 Fe) MG tablet delayed-release EC tablet Take 324 mg by mouth Daily With Breakfast.     • fluticasone (FLONASE) 50 MCG/ACT nasal spray 2 sprays into the nostril(s) as directed by provider Daily.     • fluticasone (FLONASE) 50 MCG/ACT nasal spray Daily.     • indapamide (LOZOL) 2.5 MG tablet Take 2.5 mg by mouth every morning.       • indapamide (LOZOL) 2.5 MG tablet Daily.     • ipratropium-albuterol (DUO-NEB) 0.5-2.5 mg/3 ml  "nebulizer Every 6 (Six) Hours.     • ipratropium-albuterol (DUO-NEB) 0.5-2.5 mg/mL nebulizer Take 3 mL by nebulization Every 4 (Four) Hours As Needed for wheezing.     • ipratropium-albuterol (DUO-NEB) 0.5-2.5 mg/mL nebulizer Inhale 1 vial into the lungs 4 times daily     • linaclotide (LINZESS) 145 MCG capsule capsule Daily.     • Linaclotide (LINZESS) 145 MCG capsule Take 145 mcg by mouth every morning (before breakfast)     • meloxicam (MOBIC) 15 MG tablet Daily.     • Multiple Vitamins-Minerals (PRESERVISION AREDS 2+MULTI VIT PO)      • O2 (OXYGEN) Indications: uses 8-10 hrs nightly     • omeprazole (priLOSEC) 20 MG capsule Take 20 mg by mouth Daily.     • omeprazole (priLOSEC) 20 MG capsule Daily.     • potassium chloride (K-DUR) 10 MEQ CR tablet Take 10 mEq by mouth 2 (Two) Times a Day.     • potassium chloride (KLOR-CON) 10 MEQ CR tablet 2 tablet     • POTASSIUM PO Take 10 mEq by mouth 2 times daily.       • salmeterol (SEREVENT DISKUS) 50 MCG/DOSE diskus inhaler Inhale 1 puff into the lungs 2 times daily.       • salmeterol (SEREVENT DISKUS) 50 MCG/DOSE diskus inhaler Every 12 (Twelve) Hours.       No current facility-administered medications for this visit.        Review of Systems   Constitutional: Positive for activity change, appetite change and fatigue.   HENT: Positive for hearing loss.    Eyes: Positive for visual disturbance.   Respiratory: Positive for cough, chest tightness, shortness of breath and wheezing.    Cardiovascular: Positive for chest pain and leg swelling.   Gastrointestinal: Positive for abdominal pain and diarrhea.   Endocrine: Positive for cold intolerance and heat intolerance.   Genitourinary: Positive for enuresis and frequency.   Musculoskeletal: Positive for arthralgias and joint swelling.        Foot pain  Ankle pain  Joint pain   Neurological: Positive for dizziness, numbness and headaches.   Psychiatric/Behavioral: Negative.          OBJECTIVE    Pulse 82   Ht 165.1 cm (65\")  "  Wt 56.7 kg (125 lb)   SpO2 97%   BMI 20.80 kg/m²       Physical Exam   Constitutional: She is oriented to person, place, and time. She appears well-developed and well-nourished.   HENT:   Head: Normocephalic and atraumatic.   Nose: Nose normal.   Eyes: Pupils are equal, round, and reactive to light. Conjunctivae and EOM are normal.   Pulmonary/Chest: Effort normal. No respiratory distress. She has no wheezes.   Neurological: She is alert and oriented to person, place, and time. She displays normal reflexes.   Skin: She is not diaphoretic.   Psychiatric: She has a normal mood and affect. Her behavior is normal.         Lower Extremity    Cardiovascular:    DP/PT pulses faintly palpable bilateral   CFT intact to all digits  Skin temp is warm to cold from proximal tibia to distal digits bilateral  Pedal hair growth diminished    No erythema or edema noted     Musculoskeletal:  Muscle strength is 5/5 for all muscle groups tested   ROM of the 1st MTP is full without pain or crepitus bilateral  Mild HAV bilateral   ROM of the MTJ is full without pain or crepitus  bilateral  ROM of the STJ is full without pain or crepitus bilateral   ROM of the ankle joint is full without pain or crepitus  bilateral    Dermatological:   Nails 1-5 bilateral are thickened, discolored  Skin is warm, dry and intact bilateral  Webspaces 1-4 bilateral are clean, dry and intact.   No subcutaneous nodules or masses noted      Neurological:   Protective sensation intact   Sensation intact to light touch        Procedures        ASSESSMENT AND PLAN    Marii was seen today for pily results, edema, pain, pain, edema and pily results.    Diagnoses and all orders for this visit:    Foot pain, bilateral    Pes planus of both feet    Lower extremity edema      - ABIs are within normal limits  - Recommended over-the-counter compression stocking for edema control  - Rx for custom orthotics for painful flat feet  - All her questions were answered  -  Return to clinic next scheduled follow-up.          This document has been electronically signed by Jose R Dewitt DPM on September 28, 2018 10:29 AM     9/28/2018  10:29 AM

## 2018-10-04 ENCOUNTER — TRANSCRIBE ORDERS (OUTPATIENT)
Dept: PODIATRY | Facility: CLINIC | Age: 83
End: 2018-10-04

## 2018-10-04 DIAGNOSIS — M21.40 PES PLANUS, UNSPECIFIED LATERALITY: Primary | ICD-10-CM

## 2018-10-18 ENCOUNTER — HOSPITAL ENCOUNTER (OUTPATIENT)
Dept: PHYSICAL THERAPY | Facility: HOSPITAL | Age: 83
Setting detail: THERAPIES SERIES
Discharge: HOME | End: 2018-10-18
Attending: PODIATRIST

## 2018-10-31 ENCOUNTER — HOSPITAL ENCOUNTER (OUTPATIENT)
Dept: PAIN MANAGEMENT | Age: 83
Discharge: HOME | End: 2018-10-31
Payer: MEDICARE

## 2018-10-31 VITALS
DIASTOLIC BLOOD PRESSURE: 67 MMHG | SYSTOLIC BLOOD PRESSURE: 130 MMHG | TEMPERATURE: 97.1 F | OXYGEN SATURATION: 96 % | HEART RATE: 71 BPM | RESPIRATION RATE: 18 BRPM | WEIGHT: 131 LBS | BODY MASS INDEX: 21.83 KG/M2 | HEIGHT: 65 IN

## 2018-10-31 DIAGNOSIS — M50.10 CERVICAL DISC DISORDER WITH RADICULOPATHY: ICD-10-CM

## 2018-10-31 PROCEDURE — 20553 NJX 1/MLT TRIGGER POINTS 3/>: CPT | Performed by: NURSE PRACTITIONER

## 2018-10-31 PROCEDURE — 20553 NJX 1/MLT TRIGGER POINTS 3/>: CPT

## 2018-10-31 PROCEDURE — 2500000003 HC RX 250 WO HCPCS

## 2018-10-31 RX ORDER — BUPIVACAINE HYDROCHLORIDE 5 MG/ML
INJECTION, SOLUTION EPIDURAL; INTRACAUDAL
Status: COMPLETED | OUTPATIENT
Start: 2018-10-31 | End: 2018-10-31

## 2018-10-31 RX ORDER — FERROUS SULFATE 325(65) MG
325 TABLET ORAL
COMMUNITY
End: 2019-08-20 | Stop reason: ALTCHOICE

## 2018-10-31 RX ORDER — ALBUTEROL SULFATE 90 UG/1
2 AEROSOL, METERED RESPIRATORY (INHALATION) 4 TIMES DAILY
COMMUNITY

## 2018-10-31 RX ORDER — LIDOCAINE HYDROCHLORIDE 10 MG/ML
INJECTION, SOLUTION EPIDURAL; INFILTRATION; INTRACAUDAL; PERINEURAL
Status: COMPLETED | OUTPATIENT
Start: 2018-10-31 | End: 2018-10-31

## 2018-10-31 RX ADMIN — BUPIVACAINE HYDROCHLORIDE 10 ML: 5 INJECTION, SOLUTION EPIDURAL; INTRACAUDAL at 09:15

## 2018-10-31 RX ADMIN — LIDOCAINE HYDROCHLORIDE 10 ML: 10 INJECTION, SOLUTION EPIDURAL; INFILTRATION; INTRACAUDAL; PERINEURAL at 09:15

## 2018-10-31 ASSESSMENT — ACTIVITIES OF DAILY LIVING (ADL): EFFECT OF PAIN ON DAILY ACTIVITIES: LIMITS ACTIVITIES

## 2018-10-31 ASSESSMENT — PAIN - FUNCTIONAL ASSESSMENT: PAIN_FUNCTIONAL_ASSESSMENT: 0-10

## 2018-10-31 ASSESSMENT — PAIN DESCRIPTION - DESCRIPTORS: DESCRIPTORS: ACHING;CONSTANT;NUMBNESS;TINGLING

## 2018-10-31 NOTE — PROCEDURES
[facility]    Patient Name: Isabel Wolff    : 1933                    Age: 80 y.o. Sex: female    Date: 2018    Pre-op Diagnosis: Myofascial Pain/ Muscle Spasms/ Cervicalgia    Post-op Diagnosis: Myofascial Pain/ Muscle Spasms/ Cervicalgia    Procedure: Cervical Trigger Point Injections    Performing Procedure: TAHIR Spencer, CHRIS    Previously Had Procedure:  [] Yes   [x] No    Patient Vitals for the past 24 hrs:   BP Temp Temp src Pulse Resp SpO2 Height Weight   10/31/18 0849 130/67 97.1 °F (36.2 °C) Oral 71 18 96 % 5' 5\" (1.651 m) 131 lb (59.4 kg)       Description of Procedure:    After a brief physical assessment and failure to improve with conservative measures the patient presented for Cervical Trigger Point Injections The indications, limitations and possible complications were discussed with the patient and the patient elected to proceed with the procedure. After voluntary, informed and signed consent obtained the patient was placed in a seated position. Appropriate time out was obtained per policy. The area of maximal tenderness was palpated over the  [] Right  [] Left  [x] Bilateral Cervical  [x] Splenius  [x] Trapezius  [x] Rhomboid. The skin overlying these areas was marked with a skin marker. The skin overlying the proposed injection sites were then sprayed with Gebauer's Solution while protecting patient eyes. The areas were then prepped in a sterile fashion with Chloro-Prep. Each trigger point of the  [] Right   [] Left  [x] Bilateral Cervical  [x] Splenius  [x] Trapezius  [x] Rhomboid was injected with approximately 2 ml of a solution of 5 ml of 1% Lidocaine Plain and 5 ml of 0.5% Marcaine Plain    [] with Decadron 0.5 ml (10mg/ml)   [] with Toradol 0.5 ml (15mg/ml)  (approximately 20 ml total) using a 25 gauge 1 1/2 inch needle. Sterile dressings applied. Discharge:   The patient tolerated

## 2018-11-28 ENCOUNTER — HOSPITAL ENCOUNTER (OUTPATIENT)
Dept: PAIN MANAGEMENT | Age: 83
Discharge: HOME | End: 2018-11-28
Payer: MEDICARE

## 2018-11-28 VITALS
RESPIRATION RATE: 18 BRPM | DIASTOLIC BLOOD PRESSURE: 68 MMHG | HEIGHT: 65 IN | HEART RATE: 79 BPM | TEMPERATURE: 97 F | SYSTOLIC BLOOD PRESSURE: 104 MMHG | BODY MASS INDEX: 21.83 KG/M2 | WEIGHT: 131 LBS | OXYGEN SATURATION: 94 %

## 2018-11-28 DIAGNOSIS — M50.10 CERVICAL DISC DISORDER WITH RADICULOPATHY: ICD-10-CM

## 2018-11-28 DIAGNOSIS — G89.29 CHRONIC LEFT SHOULDER PAIN: Primary | ICD-10-CM

## 2018-11-28 DIAGNOSIS — M25.512 CHRONIC LEFT SHOULDER PAIN: Primary | ICD-10-CM

## 2018-11-28 PROCEDURE — 99213 OFFICE O/P EST LOW 20 MIN: CPT

## 2018-11-28 PROCEDURE — 99214 OFFICE O/P EST MOD 30 MIN: CPT | Performed by: NURSE PRACTITIONER

## 2018-11-28 RX ORDER — EMOLLIENT BASE
CREAM (GRAM) TOPICAL
Qty: 300 G | Refills: 5 | Status: SHIPPED | OUTPATIENT
Start: 2018-11-28 | End: 2019-08-20 | Stop reason: ALTCHOICE

## 2018-11-28 RX ORDER — CEFUROXIME AXETIL 500 MG/1
500 TABLET ORAL 2 TIMES DAILY
COMMUNITY
End: 2019-02-21 | Stop reason: ALTCHOICE

## 2018-11-28 RX ORDER — TRAZODONE HYDROCHLORIDE 50 MG/1
50 TABLET ORAL NIGHTLY
COMMUNITY

## 2018-11-28 ASSESSMENT — PAIN DESCRIPTION - ONSET: ONSET: ON-GOING

## 2018-11-28 ASSESSMENT — PAIN DESCRIPTION - LOCATION: LOCATION: SHOULDER

## 2018-11-28 ASSESSMENT — PAIN DESCRIPTION - PAIN TYPE: TYPE: CHRONIC PAIN

## 2018-11-28 ASSESSMENT — PAIN DESCRIPTION - FREQUENCY: FREQUENCY: CONTINUOUS

## 2018-11-28 ASSESSMENT — PAIN DESCRIPTION - ORIENTATION: ORIENTATION: LEFT

## 2018-11-28 ASSESSMENT — PAIN DESCRIPTION - DESCRIPTORS: DESCRIPTORS: ACHING;CONSTANT;RADIATING

## 2018-11-28 ASSESSMENT — PAIN DESCRIPTION - PROGRESSION: CLINICAL_PROGRESSION: NOT CHANGED

## 2018-11-28 ASSESSMENT — ACTIVITIES OF DAILY LIVING (ADL): EFFECT OF PAIN ON DAILY ACTIVITIES: LIMITED RANGE OF MOTION

## 2018-11-28 ASSESSMENT — PAIN DESCRIPTION - DIRECTION: RADIATING_TOWARDS: LEFT ARM

## 2018-11-30 ENCOUNTER — OFFICE VISIT (OUTPATIENT)
Dept: PODIATRY | Facility: CLINIC | Age: 83
End: 2018-11-30

## 2018-11-30 VITALS — WEIGHT: 130 LBS | HEIGHT: 65 IN | HEART RATE: 89 BPM | BODY MASS INDEX: 21.66 KG/M2 | OXYGEN SATURATION: 98 %

## 2018-11-30 DIAGNOSIS — M79.675 CHRONIC PAIN OF TOE OF LEFT FOOT: ICD-10-CM

## 2018-11-30 DIAGNOSIS — G89.29 CHRONIC PAIN OF TOE OF LEFT FOOT: ICD-10-CM

## 2018-11-30 DIAGNOSIS — M79.674 CHRONIC PAIN OF TOE OF RIGHT FOOT: ICD-10-CM

## 2018-11-30 DIAGNOSIS — B35.1 ONYCHOMYCOSIS: Primary | ICD-10-CM

## 2018-11-30 DIAGNOSIS — G89.29 CHRONIC PAIN OF TOE OF RIGHT FOOT: ICD-10-CM

## 2018-11-30 PROCEDURE — 11721 DEBRIDE NAIL 6 OR MORE: CPT | Performed by: PODIATRIST

## 2018-12-02 ASSESSMENT — ENCOUNTER SYMPTOMS
SORE THROAT: 0
SHORTNESS OF BREATH: 0
WHEEZING: 0
CONSTIPATION: 0

## 2019-01-01 ENCOUNTER — TELEPHONE (OUTPATIENT)
Dept: PULMONOLOGY | Facility: CLINIC | Age: 84
End: 2019-01-01

## 2019-01-01 ENCOUNTER — OFFICE VISIT (OUTPATIENT)
Dept: PULMONOLOGY | Facility: CLINIC | Age: 84
End: 2019-01-01

## 2019-01-01 ENCOUNTER — PROCEDURE VISIT (OUTPATIENT)
Dept: PULMONOLOGY | Facility: CLINIC | Age: 84
End: 2019-01-01

## 2019-01-01 ENCOUNTER — HOSPITAL ENCOUNTER (OUTPATIENT)
Dept: CT IMAGING | Facility: HOSPITAL | Age: 84
Discharge: HOME | End: 2019-09-13
Admitting: INTERNAL MEDICINE

## 2019-01-01 VITALS
OXYGEN SATURATION: 96 % | BODY MASS INDEX: 22.16 KG/M2 | WEIGHT: 133 LBS | HEART RATE: 91 BPM | DIASTOLIC BLOOD PRESSURE: 60 MMHG | HEIGHT: 65 IN | SYSTOLIC BLOOD PRESSURE: 110 MMHG

## 2019-01-01 VITALS
SYSTOLIC BLOOD PRESSURE: 110 MMHG | HEART RATE: 101 BPM | HEIGHT: 65 IN | OXYGEN SATURATION: 96 % | WEIGHT: 133 LBS | BODY MASS INDEX: 22.16 KG/M2 | DIASTOLIC BLOOD PRESSURE: 54 MMHG

## 2019-01-01 VITALS
BODY MASS INDEX: 21.99 KG/M2 | WEIGHT: 132 LBS | HEIGHT: 65 IN | DIASTOLIC BLOOD PRESSURE: 60 MMHG | SYSTOLIC BLOOD PRESSURE: 122 MMHG | OXYGEN SATURATION: 97 % | HEART RATE: 81 BPM

## 2019-01-01 VITALS
WEIGHT: 137 LBS | DIASTOLIC BLOOD PRESSURE: 74 MMHG | BODY MASS INDEX: 22.82 KG/M2 | HEIGHT: 65 IN | HEART RATE: 74 BPM | SYSTOLIC BLOOD PRESSURE: 130 MMHG | OXYGEN SATURATION: 96 %

## 2019-01-01 DIAGNOSIS — J44.9 STAGE 2 MODERATE COPD BY GOLD CLASSIFICATION (HCC): ICD-10-CM

## 2019-01-01 DIAGNOSIS — R91.1 LUNG NODULE: Primary | ICD-10-CM

## 2019-01-01 DIAGNOSIS — R91.1 LUNG NODULE: ICD-10-CM

## 2019-01-01 DIAGNOSIS — F17.210 CIGARETTE NICOTINE DEPENDENCE WITHOUT COMPLICATION: ICD-10-CM

## 2019-01-01 DIAGNOSIS — Z87.891 PERSONAL HISTORY OF NICOTINE DEPENDENCE: ICD-10-CM

## 2019-01-01 DIAGNOSIS — J44.9 STAGE 2 MODERATE COPD BY GOLD CLASSIFICATION (HCC): Primary | ICD-10-CM

## 2019-01-01 DIAGNOSIS — R63.6 UNDERWEIGHT: ICD-10-CM

## 2019-01-01 DIAGNOSIS — G47.34 NOCTURNAL HYPOXEMIA: ICD-10-CM

## 2019-01-01 DIAGNOSIS — K21.9 GASTROESOPHAGEAL REFLUX DISEASE, ESOPHAGITIS PRESENCE NOT SPECIFIED: ICD-10-CM

## 2019-01-01 DIAGNOSIS — J43.2 CENTRILOBULAR EMPHYSEMA (HCC): ICD-10-CM

## 2019-01-01 LAB
DIFF CAP.CO: NORMAL ML/MMHG SEC
FEV1/FVC: 56.12 %
FEV1/FVC: NORMAL %
FEV1: NORMAL LITERS
FEV1: NORMAL LITERS
FVC VOL RESPIRATORY: NORMAL LITERS
FVC VOL RESPIRATORY: NORMAL LITERS

## 2019-01-01 PROCEDURE — 94729 DIFFUSING CAPACITY: CPT | Performed by: NURSE PRACTITIONER

## 2019-01-01 PROCEDURE — 99214 OFFICE O/P EST MOD 30 MIN: CPT | Performed by: NURSE PRACTITIONER

## 2019-01-01 PROCEDURE — 71250 CT THORAX DX C-: CPT

## 2019-01-01 PROCEDURE — 99214 OFFICE O/P EST MOD 30 MIN: CPT | Performed by: INTERNAL MEDICINE

## 2019-01-01 PROCEDURE — 94010 BREATHING CAPACITY TEST: CPT | Performed by: NURSE PRACTITIONER

## 2019-01-01 RX ORDER — FAMOTIDINE 20 MG/1
20 TABLET, FILM COATED ORAL
COMMUNITY

## 2019-01-01 RX ORDER — AZITHROMYCIN 250 MG/1
TABLET, FILM COATED ORAL
Refills: 0 | COMMUNITY
Start: 2019-01-01

## 2019-01-01 RX ORDER — NICOTINE 21 MG/24HR
1 PATCH, TRANSDERMAL 24 HOURS TRANSDERMAL
COMMUNITY
Start: 2019-01-01

## 2019-01-01 RX ORDER — TRAZODONE HYDROCHLORIDE 50 MG/1
50 TABLET ORAL
COMMUNITY

## 2019-01-01 RX ORDER — FLUTICASONE PROPIONATE 50 MCG
SPRAY, SUSPENSION (ML) NASAL
COMMUNITY

## 2019-01-01 RX ORDER — NICOTINE 14 MG/24H
PATCH, EXTENDED RELEASE TOPICAL
Refills: 3 | COMMUNITY
Start: 2019-01-01

## 2019-01-01 RX ORDER — METOPROLOL SUCCINATE 25 MG/1
12.5 TABLET, EXTENDED RELEASE ORAL
COMMUNITY
Start: 2017-12-21

## 2019-01-01 RX ORDER — LIDOCAINE 50 MG/G
OINTMENT TOPICAL
Refills: 5 | COMMUNITY
Start: 2018-11-29 | End: 2019-01-01

## 2019-01-01 RX ORDER — TOLTERODINE TARTRATE 2 MG/1
2 TABLET, EXTENDED RELEASE ORAL
COMMUNITY

## 2019-01-01 RX ORDER — METHYLPREDNISOLONE 4 MG/1
TABLET ORAL
Refills: 0 | COMMUNITY
Start: 2019-01-01

## 2019-01-01 RX ORDER — TRAZODONE HYDROCHLORIDE 50 MG/1
TABLET ORAL
COMMUNITY
End: 2019-01-01

## 2019-01-01 ASSESSMENT — PULMONARY FUNCTION TESTS: FEV1/FVC: 56.12

## 2019-01-23 PROBLEM — J44.9 STAGE 1 MILD COPD BY GOLD CLASSIFICATION (HCC): Status: ACTIVE | Noted: 2019-01-23

## 2019-02-07 ENCOUNTER — OFFICE VISIT (OUTPATIENT)
Dept: NEUROSURGERY | Age: 84
End: 2019-02-07
Payer: MEDICARE

## 2019-02-07 VITALS
SYSTOLIC BLOOD PRESSURE: 118 MMHG | WEIGHT: 136.8 LBS | DIASTOLIC BLOOD PRESSURE: 68 MMHG | BODY MASS INDEX: 22.76 KG/M2 | OXYGEN SATURATION: 96 % | HEART RATE: 79 BPM

## 2019-02-07 DIAGNOSIS — M54.42 CHRONIC LOW BACK PAIN WITH BILATERAL SCIATICA, UNSPECIFIED BACK PAIN LATERALITY: Primary | ICD-10-CM

## 2019-02-07 DIAGNOSIS — R20.2 NUMBNESS AND TINGLING OF BOTH LEGS: ICD-10-CM

## 2019-02-07 DIAGNOSIS — G89.29 CHRONIC LOW BACK PAIN WITH BILATERAL SCIATICA, UNSPECIFIED BACK PAIN LATERALITY: Primary | ICD-10-CM

## 2019-02-07 DIAGNOSIS — R20.0 NUMBNESS AND TINGLING OF BOTH LEGS: ICD-10-CM

## 2019-02-07 DIAGNOSIS — M54.2 NECK PAIN: ICD-10-CM

## 2019-02-07 DIAGNOSIS — M54.41 CHRONIC LOW BACK PAIN WITH BILATERAL SCIATICA, UNSPECIFIED BACK PAIN LATERALITY: Primary | ICD-10-CM

## 2019-02-07 PROCEDURE — 4040F PNEUMOC VAC/ADMIN/RCVD: CPT | Performed by: NURSE PRACTITIONER

## 2019-02-07 PROCEDURE — 1090F PRES/ABSN URINE INCON ASSESS: CPT | Performed by: NURSE PRACTITIONER

## 2019-02-07 PROCEDURE — G8484 FLU IMMUNIZE NO ADMIN: HCPCS | Performed by: NURSE PRACTITIONER

## 2019-02-07 PROCEDURE — G8427 DOCREV CUR MEDS BY ELIG CLIN: HCPCS | Performed by: NURSE PRACTITIONER

## 2019-02-07 PROCEDURE — 4004F PT TOBACCO SCREEN RCVD TLK: CPT | Performed by: NURSE PRACTITIONER

## 2019-02-07 PROCEDURE — G8400 PT W/DXA NO RESULTS DOC: HCPCS | Performed by: NURSE PRACTITIONER

## 2019-02-07 PROCEDURE — G8420 CALC BMI NORM PARAMETERS: HCPCS | Performed by: NURSE PRACTITIONER

## 2019-02-07 PROCEDURE — G8598 ASA/ANTIPLAT THER USED: HCPCS | Performed by: NURSE PRACTITIONER

## 2019-02-07 PROCEDURE — 1123F ACP DISCUSS/DSCN MKR DOCD: CPT | Performed by: NURSE PRACTITIONER

## 2019-02-07 PROCEDURE — 99213 OFFICE O/P EST LOW 20 MIN: CPT | Performed by: NURSE PRACTITIONER

## 2019-02-07 PROCEDURE — 1101F PT FALLS ASSESS-DOCD LE1/YR: CPT | Performed by: NURSE PRACTITIONER

## 2019-02-13 PROBLEM — Z87.891 PERSONAL HISTORY OF NICOTINE DEPENDENCE: Status: ACTIVE | Noted: 2019-01-01

## 2019-02-13 PROBLEM — F17.210 CIGARETTE NICOTINE DEPENDENCE WITHOUT COMPLICATION: Status: ACTIVE | Noted: 2019-01-01

## 2019-02-13 PROBLEM — K21.9 GASTROESOPHAGEAL REFLUX DISEASE: Status: ACTIVE | Noted: 2019-01-01

## 2019-02-13 PROBLEM — G47.34 NOCTURNAL HYPOXEMIA: Status: ACTIVE | Noted: 2019-01-01

## 2019-02-13 NOTE — PROGRESS NOTES
[address]  [facility]   [place] [address]  Phone: [phone]  Fax: [phone]     Shirley Bamforth is a 85 y.o. female.   : 1933  CC:   Chief Complaint   Patient presents with   • Shortness of Breath      HPI: Shirley Bamforth is a pleasant 85 y.o. female. The patient is here today for follow up of shortness of breath.  The patient has known COPD and is a current everyday smoker.  She is having shortness of breath with exertion.  No fevers, no chills.  She has been using duo nebs 4 times a day, albuterol rescue inhaler 4 times a day, and Serevent.  The patient feels that she is not controlled.  We did discuss smoking cessation.  She states that she has been trying to quit smoking.  She is utilizing her oxygen at night and sometimes during the day.  The patient's PCP is Zahida Ray APRN.    The following portions of the patient's history were reviewed and updated as appropriate: allergies, current medications, past family history, past medical history, past social history, past surgical history and problem list.  Past Medical History:   Diagnosis Date   • Asthma    • Bilateral foot pain    • Brain aneurysm     Does have endovascular coiling this is MRI compatible   • Cancer, uterine (CMS/HCC)    • COPD (chronic obstructive pulmonary disease) (CMS/HCC)    • Diverticulitis    • Dyspnea    • GERD (gastroesophageal reflux disease)    • Ingrown toenail    • Osteoporosis    • Rheumatoid arthritis (CMS/HCC)    • Shingles (herpes zoster) polyneuropathy      Family History   Problem Relation Age of Onset   • Arthritis Mother    • Cancer Mother    • Diabetes Mother    • Hypertension Mother    • Diabetes Brother    • Heart disease Brother    • Hypertension Brother    • Cancer Brother    • Cancer Daughter      Social History     Socioeconomic History   • Marital status: Single     Spouse name: Not on file   • Number of children:  "Not on file   • Years of education: Not on file   • Highest education level: Not on file   Social Needs   • Financial resource strain: Not on file   • Food insecurity - worry: Not on file   • Food insecurity - inability: Not on file   • Transportation needs - medical: Not on file   • Transportation needs - non-medical: Not on file   Occupational History   • Not on file   Tobacco Use   • Smoking status: Current Every Day Smoker     Packs/day: 1.00     Years: 60.00     Pack years: 60.00     Types: Cigarettes   • Smokeless tobacco: Never Used   • Tobacco comment: Trying to cut down   Substance and Sexual Activity   • Alcohol use: No   • Drug use: No   • Sexual activity: Defer   Other Topics Concern   • Not on file   Social History Narrative   • Not on file     Review of Systems   Constitutional: Negative for chills and fever.   HENT: Negative for congestion.    Eyes: Negative for blurred vision.   Respiratory: Positive for shortness of breath. Negative for cough.    Cardiovascular: Positive for leg swelling. Negative for chest pain.   Gastrointestinal: Negative for diarrhea, nausea and vomiting.   Endocrine: Negative for cold intolerance and heat intolerance.   Genitourinary: Negative for dysuria.   Musculoskeletal: Negative for arthralgias.   Skin: Negative for rash.   Neurological: Negative for dizziness, weakness and light-headedness.   Hematological: Does not bruise/bleed easily.   Psychiatric/Behavioral: Negative for agitation. The patient is not nervous/anxious.      /54   Pulse 101   Ht 165.1 cm (65\")   Wt 60.3 kg (133 lb)   SpO2 96% Comment: RA  Breastfeeding? No   BMI 22.13 kg/m²   Physical Exam   Constitutional: She is oriented to person, place, and time. She appears well-developed and well-nourished. No distress.   HENT:   Head: Normocephalic and atraumatic.   Eyes: Conjunctivae and EOM are normal. Pupils are equal, round, and reactive to light. No scleral icterus.   Neck: Normal range of motion. " Neck supple.   Cardiovascular: Normal rate, regular rhythm and normal heart sounds. Exam reveals no friction rub.   No murmur heard.  Pulmonary/Chest: Effort normal. No respiratory distress. She has no wheezes. She has rhonchi. She has no rales.   Abdominal: Soft. Bowel sounds are normal. She exhibits no distension. There is no tenderness.   Musculoskeletal: Normal range of motion. She exhibits edema (2+).   Neurological: She is alert and oriented to person, place, and time.   Skin: Skin is warm and dry.   Psychiatric: She has a normal mood and affect. Her behavior is normal.   Nursing note and vitals reviewed.    Pulmonary Functions Testing Results:  FEV1   Date Value Ref Range Status   02/13/2019 58% liters Final     FVC   Date Value Ref Range Status   02/13/2019 87% liters Final     FEV1/FVC   Date Value Ref Range Status   02/13/2019 49.54% % Final     My PFT Interpretation: The patient spirometry is consistent with moderate obstructive disease and very severe small airway disease.  Imaging: None today    Assessment and Plan:   Marii was seen today for shortness of breath.    Diagnoses and all orders for this visit:    Stage 2 moderate COPD by GOLD classification (CMS/[facility])  -     Pulmonary Function Test  -     Mometasone Furoate (ASMANEX HFA) 200 MCG/ACT aerosol; Inhale 200 mcg 2 (Two) Times a Day.  The patient was given a sample of Asmanex and instructed on how to use it with a spacer.  She is to continue that Serevent and DuoNeb's.  She is going to stop utilizing the rescue inhaler 4 times a day with the duo nebs 4 times a day.  She is also going to try to stop smoking.  Gastroesophageal reflux disease, esophagitis presence not specified  Continue Prilosec.  Nocturnal hypoxemia  Continue home oxygen she is benefiting from it and wishes to continue it.  Personal history of nicotine dependence  Current everyday smoker  Cigarette nicotine dependence without complication  Smoking cessation education was  provided    Health maintenance:   Influenza vaccine: yes   Pneumovax 23: yes   Prevnar 13: yes   Patient's Body mass index is 22.13 kg/m². BMI is within normal parameters. No follow-up required.    Follow up: 4 weeks  Amira Alicea, APRN  2/13/2019  2:19 PM

## 2019-02-13 NOTE — PATIENT INSTRUCTIONS
Chronic Obstructive Pulmonary Disease  Chronic obstructive pulmonary disease (COPD) is a long-term (chronic) lung problem. When you have COPD, it is hard for air to get in and out of your lungs. The way your lungs work will never return to normal. Usually the condition gets worse over time. There are things you can do to keep yourself as healthy as possible. Your doctor may treat your condition with:  · Medicines.  · Quitting smoking, if you smoke.  · Rehabilitation. This may involve a team of specialists.  · Oxygen.  · Exercise and changes to your diet.  · Lung surgery.  · Comfort measures (palliative care).    Follow these instructions at home:  Medicines  · Take over-the-counter and prescription medicines only as told by your doctor.  · Talk to your doctor before taking any cough or allergy medicines. You may need to avoid medicines that cause your lungs to be dry.  Lifestyle  · If you smoke, stop. Smoking makes the problem worse. If you need help quitting, ask your doctor.  · Avoid being around things that make your breathing worse. This may include smoke, chemicals, and fumes.  · Stay active, but remember to also rest.  · Learn and use tips on how to relax.  · Make sure you get enough sleep. Most adults need at least 7 hours a night.  · Eat healthy foods. Eat smaller meals more often. Rest before meals.  Controlled breathing  · Learn and use tips on how to control your breathing as told by your doctor. Try:  ? Breathing in (inhaling) through your nose for 1 second. Then, pucker your lips and breath out (exhale) through your lips for 2 seconds.  ? Putting one hand on your belly (abdomen). Breathe in slowly through your nose for 1 second. Your hand on your belly should move out. Pucker your lips and breathe out slowly through your lips. Your hand on your belly should move in as you breathe out.  Controlled coughing  · Learn and use controlled coughing to clear mucus from your lungs. The steps are:  1. Lean your  head a little forward.  2. Breathe in deeply.  3. Try to hold your breath for 3 seconds.  4. Keep your mouth slightly open while coughing 2 times.  5. Spit any mucus out into a tissue.  6. Rest and do the steps again 1 or 2 times as needed.  General instructions  · Make sure you get all the shots (vaccines) that your doctor recommends. Ask your doctor about a flu shot and a pneumonia shot.  · Use oxygen therapy and therapy to help improve your lungs (pulmonary rehabilitation) if told by your doctor. If you need home oxygen therapy, ask your doctor if you should buy a tool to measure your oxygen level (oximeter).  · Make a COPD action plan with your doctor. This helps you know what to do if you feel worse than usual.  · Manage any other conditions you have as told by your doctor.  · Avoid going outside when it is very hot, cold, or humid.  · Avoid people who have a sickness you can catch (contagious).  · Keep all follow-up visits as told by your doctor. This is important.  Contact a doctor if:  · You cough up more mucus than usual.  · There is a change in the color or thickness of the mucus.  · It is harder to breathe than usual.  · Your breathing is faster than usual.  · You have trouble sleeping.  · You need to use your medicines more often than usual.  · You have trouble doing your normal activities such as getting dressed or walking around the house.  Get help right away if:  · You have shortness of breath while resting.  · You have shortness of breath that stops you from:  ? Being able to talk.  ? Doing normal activities.  · Your chest hurts for longer than 5 minutes.  · Your skin color is more blue than usual.  · Your pulse oximeter shows that you have low oxygen for longer than 5 minutes.  · You have a fever.  · You feel too tired to breathe normally.  Summary  · Chronic obstructive pulmonary disease (COPD) is a long-term lung problem.  · The way your lungs work will never return to normal. Usually the  condition gets worse over time. There are things you can do to keep yourself as healthy as possible.  · Take over-the-counter and prescription medicines only as told by your doctor.  · If you smoke, stop. Smoking makes the problem worse.  This information is not intended to replace advice given to you by your health care provider. Make sure you discuss any questions you have with your health care provider.  Document Released: 06/05/2009 Document Revised: 05/25/2017 Document Reviewed: 08/14/2014  [employer] Interactive Patient Education © 2017 Elsevier Inc.  Smoking Tobacco Information  Smoking tobacco will very likely harm your health. Tobacco contains a poisonous (toxic), colorless chemical called nicotine. Nicotine affects the brain and makes tobacco addictive. This change in your brain can make it hard to stop smoking. Tobacco also has other toxic chemicals that can hurt your body and raise your risk of many cancers.  How can smoking tobacco affect me?  Smoking tobacco can increase your chances of having serious health conditions, such as:  · Cancer. Smoking is most commonly associated with lung cancer, but can lead to cancer in other parts of the body.  · Chronic obstructive pulmonary disease (COPD). This is a long-term lung condition that makes it hard to breathe. It also gets worse over time.  · High blood pressure (hypertension), heart disease, stroke, or heart attack.  · Lung infections, such as pneumonia.  · Cataracts. This is when the lenses in the eyes become clouded.  · Digestive problems. This may include peptic ulcers, heartburn, and gastroesophageal reflux disease (GERD).  · Oral health problems, such as gum disease and tooth loss.  · Loss of taste and smell.    Smoking can affect your appearance by causing:  · Wrinkles.  · Yellow or stained teeth, fingers, and fingernails.    Smoking tobacco can also affect your social life.  · Many workplaces, restaurants, hotels, and public places are tobacco-free.  This means that you may experience challenges in finding places to smoke when away from home.  · The cost of a smoking habit can be expensive. Expenses for someone who smokes come in two ways:  ? You spend money on a regular basis to buy tobacco.  ? Your health care costs in the long-term are higher if you smoke.  · Tobacco smoke can also affect the health of those around you. Children of smokers have greater chances of:  ? Sudden infant death syndrome (SIDS).  ? Ear infections.  ? Lung infections.    What lifestyle changes can be made?  · Do not start smoking. Quit if you already do.  · To quit smoking:  ? Make a plan to quit smoking and commit yourself to it. Look for programs to help you and ask your health care provider for recommendations and ideas.  ? Talk with your health care provider about using nicotine replacement medicines to help you quit. Medicine replacement medicines include gum, lozenges, patches, sprays, or pills.  ? Do not replace cigarette smoking with electronic cigarettes, which are commonly called e-cigarettes. The safety of e-cigarettes is not known, and some may contain harmful chemicals.  ? Avoid places, people, or situations that tempt you to smoke.  ? If you try to quit but return to smoking, don't give up hope. It is very common for people to try a number of times before they fully succeed. When you feel ready again, give it another try.  · Quitting smoking might affect the way you eat as well as your weight. Be prepared to monitor your eating habits. Get support in planning and following a healthy diet.  · Ask your health care provider about having regular tests (screenings) to check for cancer. This may include blood tests, imaging tests, and other tests.  · Exercise regularly. Consider taking walks, joining a gym, or doing yoga or exercise classes.  · Develop skills to manage your stress. These skills include meditation.  What are the benefits of quitting smoking?  By quitting  smoking, you may:  · Lower your risk of getting cancer and other diseases caused by smoking.  · Live longer.  · Breathe better.  · Lower your blood pressure and heart rate.  · Stop your addiction to tobacco.  · Stop creating secondhand smoke that hurts other people.  · Improve your sense of taste and smell.  · Look better over time, due to having fewer wrinkles and less staining.    What can happen if changes are not made?  If you do not stop smoking, you may:  · Get cancer and other diseases.  · Develop COPD or other long-term (chronic) lung conditions.  · Develop serious problems with your heart and blood vessels (cardiovascular system).  · Need more tests to screen for problems caused by smoking.  · Have higher, long-term healthcare costs from medicines or treatments related to smoking.  · Continue to have worsening changes in your lungs, mouth, and nose.    Where to find support:  To get support to quit smoking, consider:  · Asking your health care provider for more information and resources.  · Taking classes to learn more about quitting smoking.  · Looking for local organizations that offer resources about quitting smoking.  · Joining a support group for people who want to quit smoking in your local community.    Where to find more information:  You may find more information about quitting smoking from:  · HelpGuide.org: www.helpguide.org/articles/addictions/how-to-quit-smoking.htm  · Smokefree.gov: smokefree.gov  · American Lung Association: www.lung.org    Contact a health care provider if:  · You have problems breathing.  · Your lips, nose, or fingers turn blue.  · You have chest pain.  · You are coughing up blood.  · You feel faint or you pass out.  · You have other noticeable changes that cause you to worry.  Summary  · Smoking tobacco can negatively affect your health, the health of those around you, your finances, and your social life.  · Do not start smoking. Quit if you already do. If you need help  quitting, ask your health care provider.  · Think about joining a support group for people who want to quit smoking in your local community. There are many effective programs that will help you to quit this behavior.  This information is not intended to replace advice given to you by your health care provider. Make sure you discuss any questions you have with your health care provider.  Document Released: 01/02/2018 Document Revised: 01/02/2018 Document Reviewed: 01/02/2018  Elsevier Interactive Patient Education © 2018 Elsevier Inc.  s

## 2019-02-14 ENCOUNTER — TELEPHONE (OUTPATIENT)
Dept: NEUROSURGERY | Age: 84
End: 2019-02-14

## 2019-02-21 ENCOUNTER — OFFICE VISIT (OUTPATIENT)
Dept: CARDIOLOGY | Age: 84
End: 2019-02-21
Payer: MEDICARE

## 2019-02-21 VITALS
SYSTOLIC BLOOD PRESSURE: 120 MMHG | BODY MASS INDEX: 22.82 KG/M2 | WEIGHT: 137 LBS | DIASTOLIC BLOOD PRESSURE: 64 MMHG | HEART RATE: 70 BPM | HEIGHT: 65 IN

## 2019-02-21 DIAGNOSIS — R60.0 LOCALIZED EDEMA: ICD-10-CM

## 2019-02-21 DIAGNOSIS — I49.9 IRREGULAR HEARTBEAT: ICD-10-CM

## 2019-02-21 DIAGNOSIS — J43.8 OTHER EMPHYSEMA (HCC): ICD-10-CM

## 2019-02-21 DIAGNOSIS — R00.2 PALPITATIONS: Primary | ICD-10-CM

## 2019-02-21 PROCEDURE — 1101F PT FALLS ASSESS-DOCD LE1/YR: CPT | Performed by: CLINICAL NURSE SPECIALIST

## 2019-02-21 PROCEDURE — G8484 FLU IMMUNIZE NO ADMIN: HCPCS | Performed by: CLINICAL NURSE SPECIALIST

## 2019-02-21 PROCEDURE — 1036F TOBACCO NON-USER: CPT | Performed by: CLINICAL NURSE SPECIALIST

## 2019-02-21 PROCEDURE — G8926 SPIRO NO PERF OR DOC: HCPCS | Performed by: CLINICAL NURSE SPECIALIST

## 2019-02-21 PROCEDURE — 4040F PNEUMOC VAC/ADMIN/RCVD: CPT | Performed by: CLINICAL NURSE SPECIALIST

## 2019-02-21 PROCEDURE — 1090F PRES/ABSN URINE INCON ASSESS: CPT | Performed by: CLINICAL NURSE SPECIALIST

## 2019-02-21 PROCEDURE — 3023F SPIROM DOC REV: CPT | Performed by: CLINICAL NURSE SPECIALIST

## 2019-02-21 PROCEDURE — 93000 ELECTROCARDIOGRAM COMPLETE: CPT | Performed by: CLINICAL NURSE SPECIALIST

## 2019-02-21 PROCEDURE — G8598 ASA/ANTIPLAT THER USED: HCPCS | Performed by: CLINICAL NURSE SPECIALIST

## 2019-02-21 PROCEDURE — 99214 OFFICE O/P EST MOD 30 MIN: CPT | Performed by: CLINICAL NURSE SPECIALIST

## 2019-02-21 PROCEDURE — G8427 DOCREV CUR MEDS BY ELIG CLIN: HCPCS | Performed by: CLINICAL NURSE SPECIALIST

## 2019-02-21 PROCEDURE — G8420 CALC BMI NORM PARAMETERS: HCPCS | Performed by: CLINICAL NURSE SPECIALIST

## 2019-02-21 PROCEDURE — G8400 PT W/DXA NO RESULTS DOC: HCPCS | Performed by: CLINICAL NURSE SPECIALIST

## 2019-02-21 PROCEDURE — 0296T PR EXT ECG > 48HR TO 21 DAY RCRD W/CONECT INTL RCRD: CPT | Performed by: CLINICAL NURSE SPECIALIST

## 2019-02-21 PROCEDURE — 1123F ACP DISCUSS/DSCN MKR DOCD: CPT | Performed by: CLINICAL NURSE SPECIALIST

## 2019-02-21 RX ORDER — FAMOTIDINE 20 MG/1
20 TABLET, FILM COATED ORAL NIGHTLY PRN
COMMUNITY
End: 2019-03-20

## 2019-03-12 PROBLEM — J44.9 STAGE 2 MODERATE COPD BY GOLD CLASSIFICATION (HCC): Status: ACTIVE | Noted: 2019-01-01

## 2019-03-14 NOTE — PROGRESS NOTES
"[address]  [facility]   Respiratory Disease Clinic  [address] [place] [address]  Phone: [phone]  Fax: [phone]     Shirley Bamforth is a 85 y.o. female.   : 1933  CC:   Chief Complaint   Patient presents with   • Shortness of Breath   • Cough      HPI: Shirley Bamforth is a pleasant 85 y.o. female. The patient is here today for follow up of shortness of breath.  The patient has known COPD, nocturnal hypoxemia, and she is a current everyday smoker.  The patient states that she is still smoking 1-3 cigarettes/day.  She states that her apartment is currently a \"mess\".  She states that they just finished painting it.  Currently she is using Serevent, duo nebs, albuterol rescue inhaler, she did trial Asmanex at her last office visit and did not feel that she had any further benefit from that.  She wishes to continue on her current treatment regimen.  She still has shortness of breath but denies fever or chills.  Cough is productive of clear sputum.  The patient's PCP is Zahida Ray APRN.    The following portions of the patient's history were reviewed and updated as appropriate: allergies, current medications, past family history, past medical history, past social history, past surgical history and problem list.  Past Medical History:   Diagnosis Date   • Asthma    • Bilateral foot pain    • Brain aneurysm     Does have endovascular coiling this is MRI compatible   • Cancer, uterine (CMS/HCC)    • COPD (chronic obstructive pulmonary disease) (CMS/HCC)    • Diverticulitis    • Dyspnea    • GERD (gastroesophageal reflux disease)    • Ingrown toenail    • Osteoporosis    • Rheumatoid arthritis (CMS/HCC)    • Shingles (herpes zoster) polyneuropathy      Family History   Problem Relation Age of Onset   • Arthritis Mother    • Cancer Mother    • Diabetes Mother    • Hypertension Mother    • Diabetes Brother    • Heart disease Brother    • Hypertension " "Brother    • Cancer Brother    • Cancer Daughter      Social History     Socioeconomic History   • Marital status: Single     Spouse name: Not on file   • Number of children: Not on file   • Years of education: Not on file   • Highest education level: Not on file   Social Needs   • Financial resource strain: Not on file   • Food insecurity - worry: Not on file   • Food insecurity - inability: Not on file   • Transportation needs - medical: Not on file   • Transportation needs - non-medical: Not on file   Occupational History   • Not on file   Tobacco Use   • Smoking status: Current Every Day Smoker     Packs/day: 1.00     Years: 60.00     Pack years: 60.00     Types: Cigarettes   • Smokeless tobacco: Never Used   • Tobacco comment: Trying to cut down   Substance and Sexual Activity   • Alcohol use: No   • Drug use: No   • Sexual activity: Defer   Other Topics Concern   • Not on file   Social History Narrative   • Not on file     Review of Systems   Constitutional: Negative for chills and fever.   HENT: Negative for congestion.    Eyes: Negative for blurred vision.   Respiratory: Positive for cough and shortness of breath.    Cardiovascular: Negative for chest pain.   Gastrointestinal: Negative for diarrhea, nausea and vomiting.   Endocrine: Negative for cold intolerance and heat intolerance.   Genitourinary: Negative for dysuria.   Musculoskeletal: Negative for arthralgias.   Skin: Negative for rash.   Neurological: Negative for dizziness, weakness and light-headedness.   Hematological: Does not bruise/bleed easily.   Psychiatric/Behavioral: Negative for agitation. The patient is not nervous/anxious.      /60   Pulse 91   Ht 165.1 cm (65\")   Wt 60.3 kg (133 lb)   SpO2 96% Comment: ra  Breastfeeding? No   BMI 22.13 kg/m²   Physical Exam   Constitutional: She is oriented to person, place, and time. She appears well-developed and well-nourished. No distress.   HENT:   Head: Normocephalic and atraumatic. "   Eyes: Conjunctivae and EOM are normal. Pupils are equal, round, and reactive to light. No scleral icterus.   Neck: Normal range of motion. Neck supple.   Cardiovascular: Normal rate, regular rhythm and normal heart sounds. Exam reveals no friction rub.   No murmur heard.  Pulmonary/Chest: Effort normal. No respiratory distress. She has wheezes. She has rhonchi. She has no rales.   Abdominal: Soft. Bowel sounds are normal. She exhibits no distension. There is no tenderness.   Musculoskeletal: Normal range of motion. She exhibits no edema.   Neurological: She is alert and oriented to person, place, and time.   Skin: Skin is warm and dry.   Psychiatric: She has a normal mood and affect. Her behavior is normal.   Nursing note and vitals reviewed.    Pulmonary Functions Testing Results:  FEV1   Date Value Ref Range Status   03/14/2019 74% liters Final     FVC   Date Value Ref Range Status   03/14/2019 98% liters Final     FEV1/FVC   Date Value Ref Range Status   03/14/2019 56.12 % Final     DLCO   Date Value Ref Range Status   03/14/2019 58% ml/mmHg sec Final     My PFT Interpretation: Spirometry is consistent with moderate obstructive disease and severe small airway disease.  Diffusion shows a moderate diffusion impairment and remains moderate when corrected for alveolar volume.  Imaging: None today    Assessment and Plan:   Marii was seen today for shortness of breath and cough.    Diagnoses and all orders for this visit:    Stage 2 moderate COPD by GOLD classification (CMS/[facility])  -     Pulmonary Function Test  Continue current treatment regimen of Serevent, duo nebs, albuterol rescue inhaler.  Nocturnal hypoxemia  Continue nocturnal oxygen.  The patient is doing well with this and wishes to continue it.  Personal history of nicotine dependence  The patient is a current everyday smoker.    Cigarette nicotine dependence without complication  The patient smokes 1-3 cigarettes/day.    Health maintenance:   Influenza  vaccine: yes   Pneumovax 23: no   Prevnar 13: yes   Patient's Body mass index is 22.13 kg/m². BMI is below normal parameters. Recommendations include: treating the underlying disease process.    Follow up: 6 months  Amira Alicea, TERRANCE  3/14/2019  3:37 PM

## 2019-03-20 ENCOUNTER — OFFICE VISIT (OUTPATIENT)
Dept: CARDIOLOGY | Age: 84
End: 2019-03-20
Payer: MEDICARE

## 2019-03-20 VITALS
BODY MASS INDEX: 21.83 KG/M2 | SYSTOLIC BLOOD PRESSURE: 96 MMHG | DIASTOLIC BLOOD PRESSURE: 56 MMHG | WEIGHT: 131 LBS | HEIGHT: 65 IN | HEART RATE: 68 BPM

## 2019-03-20 DIAGNOSIS — R60.0 LOCALIZED EDEMA: ICD-10-CM

## 2019-03-20 DIAGNOSIS — Z72.0 TOBACCO ABUSE: ICD-10-CM

## 2019-03-20 DIAGNOSIS — R07.9 CHEST PAIN IN ADULT: ICD-10-CM

## 2019-03-20 DIAGNOSIS — R00.2 PALPITATIONS: Primary | ICD-10-CM

## 2019-03-20 PROCEDURE — G8598 ASA/ANTIPLAT THER USED: HCPCS | Performed by: CLINICAL NURSE SPECIALIST

## 2019-03-20 PROCEDURE — G8427 DOCREV CUR MEDS BY ELIG CLIN: HCPCS | Performed by: CLINICAL NURSE SPECIALIST

## 2019-03-20 PROCEDURE — G8400 PT W/DXA NO RESULTS DOC: HCPCS | Performed by: CLINICAL NURSE SPECIALIST

## 2019-03-20 PROCEDURE — G8420 CALC BMI NORM PARAMETERS: HCPCS | Performed by: CLINICAL NURSE SPECIALIST

## 2019-03-20 PROCEDURE — 1036F TOBACCO NON-USER: CPT | Performed by: CLINICAL NURSE SPECIALIST

## 2019-03-20 PROCEDURE — 1123F ACP DISCUSS/DSCN MKR DOCD: CPT | Performed by: CLINICAL NURSE SPECIALIST

## 2019-03-20 PROCEDURE — 1101F PT FALLS ASSESS-DOCD LE1/YR: CPT | Performed by: CLINICAL NURSE SPECIALIST

## 2019-03-20 PROCEDURE — 99214 OFFICE O/P EST MOD 30 MIN: CPT | Performed by: CLINICAL NURSE SPECIALIST

## 2019-03-20 PROCEDURE — G8484 FLU IMMUNIZE NO ADMIN: HCPCS | Performed by: CLINICAL NURSE SPECIALIST

## 2019-03-20 PROCEDURE — 1090F PRES/ABSN URINE INCON ASSESS: CPT | Performed by: CLINICAL NURSE SPECIALIST

## 2019-03-20 PROCEDURE — 4040F PNEUMOC VAC/ADMIN/RCVD: CPT | Performed by: CLINICAL NURSE SPECIALIST

## 2019-06-26 ENCOUNTER — HOSPITAL ENCOUNTER (OUTPATIENT)
Age: 84
Setting detail: OBSERVATION
Discharge: HOME | End: 2019-06-27
Attending: EMERGENCY MEDICINE | Admitting: INTERNAL MEDICINE
Payer: MEDICARE

## 2019-06-26 ENCOUNTER — APPOINTMENT (OUTPATIENT)
Dept: GENERAL RADIOLOGY | Age: 84
End: 2019-06-26
Payer: MEDICARE

## 2019-06-26 ENCOUNTER — OFFICE VISIT (OUTPATIENT)
Dept: CARDIOLOGY | Age: 84
End: 2019-06-26
Payer: MEDICARE

## 2019-06-26 VITALS
WEIGHT: 138 LBS | BODY MASS INDEX: 22.99 KG/M2 | SYSTOLIC BLOOD PRESSURE: 110 MMHG | DIASTOLIC BLOOD PRESSURE: 62 MMHG | HEIGHT: 65 IN

## 2019-06-26 DIAGNOSIS — R07.9 ACUTE CHEST PAIN: Primary | ICD-10-CM

## 2019-06-26 DIAGNOSIS — J44.1 COPD EXACERBATION (HCC): ICD-10-CM

## 2019-06-26 DIAGNOSIS — R07.9 CHEST PAIN, UNSPECIFIED TYPE: Primary | ICD-10-CM

## 2019-06-26 PROBLEM — R60.0 LEG EDEMA, LEFT: Status: ACTIVE | Noted: 2019-06-26

## 2019-06-26 LAB
ALBUMIN SERPL-MCNC: 4.1 G/DL (ref 3.5–5.2)
ALP BLD-CCNC: 86 U/L (ref 35–104)
ALT SERPL-CCNC: 10 U/L (ref 5–33)
ANION GAP SERPL CALCULATED.3IONS-SCNC: 10 MMOL/L (ref 7–19)
APTT: 42.3 SEC (ref 26–36.2)
AST SERPL-CCNC: 13 U/L (ref 5–32)
BASE EXCESS ARTERIAL: 0.8 MMOL/L (ref -2–2)
BASOPHILS ABSOLUTE: 0.1 K/UL (ref 0–0.2)
BASOPHILS RELATIVE PERCENT: 0.6 % (ref 0–1)
BILIRUB SERPL-MCNC: 0.4 MG/DL (ref 0.2–1.2)
BUN BLDV-MCNC: 19 MG/DL (ref 8–23)
CALCIUM SERPL-MCNC: 9.2 MG/DL (ref 8.8–10.2)
CARBOXYHEMOGLOBIN ARTERIAL: 3 % (ref 0–5)
CHLORIDE BLD-SCNC: 97 MMOL/L (ref 98–111)
CO2: 29 MMOL/L (ref 22–29)
CREAT SERPL-MCNC: 0.8 MG/DL (ref 0.5–0.9)
EOSINOPHILS ABSOLUTE: 0.3 K/UL (ref 0–0.6)
EOSINOPHILS RELATIVE PERCENT: 3.5 % (ref 0–5)
GFR NON-AFRICAN AMERICAN: >60
GLUCOSE BLD-MCNC: 83 MG/DL (ref 74–109)
HCO3 ARTERIAL: 25.3 MMOL/L (ref 22–26)
HCT VFR BLD CALC: 37.2 % (ref 37–47)
HEMOGLOBIN, ART, EXTENDED: 11.3 G/DL (ref 12–16)
HEMOGLOBIN: 12 G/DL (ref 12–16)
INR BLD: 2.68 (ref 0.88–1.18)
LYMPHOCYTES ABSOLUTE: 2.8 K/UL (ref 1.1–4.5)
LYMPHOCYTES RELATIVE PERCENT: 30.1 % (ref 20–40)
MCH RBC QN AUTO: 32.7 PG (ref 27–31)
MCHC RBC AUTO-ENTMCNC: 32.3 G/DL (ref 33–37)
MCV RBC AUTO: 101.4 FL (ref 81–99)
METHEMOGLOBIN ARTERIAL: 1 %
MONOCYTES ABSOLUTE: 0.9 K/UL (ref 0–0.9)
MONOCYTES RELATIVE PERCENT: 9.4 % (ref 0–10)
NEUTROPHILS ABSOLUTE: 5.2 K/UL (ref 1.5–7.5)
NEUTROPHILS RELATIVE PERCENT: 54.9 % (ref 50–65)
O2 CONTENT ARTERIAL: 14.8 ML/DL
O2 SAT, ARTERIAL: 92.7 %
O2 THERAPY: ABNORMAL
PCO2 ARTERIAL: 39 MMHG (ref 35–45)
PDW BLD-RTO: 13.9 % (ref 11.5–14.5)
PH ARTERIAL: 7.42 (ref 7.35–7.45)
PLATELET # BLD: 189 K/UL (ref 130–400)
PMV BLD AUTO: 8.4 FL (ref 9.4–12.3)
PO2 ARTERIAL: 71 MMHG (ref 80–100)
POTASSIUM SERPL-SCNC: 4.5 MMOL/L (ref 3.5–5)
POTASSIUM, WHOLE BLOOD: 3.8
PRO-BNP: 466 PG/ML (ref 0–1800)
PROTHROMBIN TIME: 27.8 SEC (ref 12–14.6)
RBC # BLD: 3.67 M/UL (ref 4.2–5.4)
SODIUM BLD-SCNC: 136 MMOL/L (ref 136–145)
TOTAL PROTEIN: 6.6 G/DL (ref 6.6–8.7)
TROPONIN: <0.01 NG/ML (ref 0–0.03)
WBC # BLD: 9.4 K/UL (ref 4.8–10.8)

## 2019-06-26 PROCEDURE — 6360000002 HC RX W HCPCS: Performed by: FAMILY MEDICINE

## 2019-06-26 PROCEDURE — 84132 ASSAY OF SERUM POTASSIUM: CPT

## 2019-06-26 PROCEDURE — G0378 HOSPITAL OBSERVATION PER HR: HCPCS

## 2019-06-26 PROCEDURE — 93971 EXTREMITY STUDY: CPT

## 2019-06-26 PROCEDURE — 2580000003 HC RX 258: Performed by: FAMILY MEDICINE

## 2019-06-26 PROCEDURE — 6370000000 HC RX 637 (ALT 250 FOR IP): Performed by: FAMILY MEDICINE

## 2019-06-26 PROCEDURE — 93005 ELECTROCARDIOGRAM TRACING: CPT

## 2019-06-26 PROCEDURE — 6370000000 HC RX 637 (ALT 250 FOR IP): Performed by: EMERGENCY MEDICINE

## 2019-06-26 PROCEDURE — 80053 COMPREHEN METABOLIC PANEL: CPT

## 2019-06-26 PROCEDURE — 84484 ASSAY OF TROPONIN QUANT: CPT

## 2019-06-26 PROCEDURE — 96374 THER/PROPH/DIAG INJ IV PUSH: CPT

## 2019-06-26 PROCEDURE — 2500000003 HC RX 250 WO HCPCS: Performed by: EMERGENCY MEDICINE

## 2019-06-26 PROCEDURE — 83880 ASSAY OF NATRIURETIC PEPTIDE: CPT

## 2019-06-26 PROCEDURE — 93000 ELECTROCARDIOGRAM COMPLETE: CPT | Performed by: NURSE PRACTITIONER

## 2019-06-26 PROCEDURE — 96365 THER/PROPH/DIAG IV INF INIT: CPT

## 2019-06-26 PROCEDURE — 85730 THROMBOPLASTIN TIME PARTIAL: CPT

## 2019-06-26 PROCEDURE — 6360000002 HC RX W HCPCS: Performed by: EMERGENCY MEDICINE

## 2019-06-26 PROCEDURE — 99285 EMERGENCY DEPT VISIT HI MDM: CPT | Performed by: EMERGENCY MEDICINE

## 2019-06-26 PROCEDURE — 99219 PR INITIAL OBSERVATION CARE/DAY 50 MINUTES: CPT | Performed by: FAMILY MEDICINE

## 2019-06-26 PROCEDURE — 82803 BLOOD GASES ANY COMBINATION: CPT

## 2019-06-26 PROCEDURE — 36600 WITHDRAWAL OF ARTERIAL BLOOD: CPT

## 2019-06-26 PROCEDURE — 94640 AIRWAY INHALATION TREATMENT: CPT

## 2019-06-26 PROCEDURE — 2580000003 HC RX 258: Performed by: EMERGENCY MEDICINE

## 2019-06-26 PROCEDURE — 96375 TX/PRO/DX INJ NEW DRUG ADDON: CPT

## 2019-06-26 PROCEDURE — 71045 X-RAY EXAM CHEST 1 VIEW: CPT

## 2019-06-26 PROCEDURE — 85610 PROTHROMBIN TIME: CPT

## 2019-06-26 PROCEDURE — 99215 OFFICE O/P EST HI 40 MIN: CPT | Performed by: INTERNAL MEDICINE

## 2019-06-26 PROCEDURE — 85025 COMPLETE CBC W/AUTO DIFF WBC: CPT

## 2019-06-26 PROCEDURE — 99285 EMERGENCY DEPT VISIT HI MDM: CPT

## 2019-06-26 PROCEDURE — 36415 COLL VENOUS BLD VENIPUNCTURE: CPT

## 2019-06-26 RX ORDER — FLUTICASONE PROPIONATE 50 MCG
2 SPRAY, SUSPENSION (ML) NASAL DAILY
Status: DISCONTINUED | OUTPATIENT
Start: 2019-06-26 | End: 2019-06-27 | Stop reason: HOSPADM

## 2019-06-26 RX ORDER — HYDROCODONE BITARTRATE AND ACETAMINOPHEN 10; 325 MG/1; MG/1
1 TABLET ORAL EVERY 6 HOURS PRN
COMMUNITY
End: 2019-08-20 | Stop reason: ALTCHOICE

## 2019-06-26 RX ORDER — FAMOTIDINE 20 MG/1
20 TABLET, FILM COATED ORAL DAILY
COMMUNITY

## 2019-06-26 RX ORDER — HYDROCODONE BITARTRATE AND ACETAMINOPHEN 10; 325 MG/1; MG/1
1 TABLET ORAL EVERY 6 HOURS PRN
Status: DISCONTINUED | OUTPATIENT
Start: 2019-06-26 | End: 2019-06-27 | Stop reason: HOSPADM

## 2019-06-26 RX ORDER — SODIUM CHLORIDE 0.9 % (FLUSH) 0.9 %
10 SYRINGE (ML) INJECTION PRN
Status: DISCONTINUED | OUTPATIENT
Start: 2019-06-26 | End: 2019-06-27 | Stop reason: HOSPADM

## 2019-06-26 RX ORDER — METHYLPREDNISOLONE SODIUM SUCCINATE 40 MG/ML
40 INJECTION, POWDER, LYOPHILIZED, FOR SOLUTION INTRAMUSCULAR; INTRAVENOUS DAILY
Status: DISCONTINUED | OUTPATIENT
Start: 2019-06-27 | End: 2019-06-27

## 2019-06-26 RX ORDER — DOXYCYCLINE HYCLATE 100 MG
100 TABLET ORAL 2 TIMES DAILY
COMMUNITY
End: 2019-08-20 | Stop reason: ALTCHOICE

## 2019-06-26 RX ORDER — ACETAMINOPHEN 325 MG/1
650 TABLET ORAL EVERY 6 HOURS PRN
Status: DISCONTINUED | OUTPATIENT
Start: 2019-06-26 | End: 2019-06-27 | Stop reason: HOSPADM

## 2019-06-26 RX ORDER — M-VIT,TX,IRON,MINS/CALC/FOLIC 27MG-0.4MG
1 TABLET ORAL 2 TIMES DAILY
Status: DISCONTINUED | OUTPATIENT
Start: 2019-06-26 | End: 2019-06-27 | Stop reason: HOSPADM

## 2019-06-26 RX ORDER — FERROUS SULFATE 325(65) MG
325 TABLET ORAL
Status: DISCONTINUED | OUTPATIENT
Start: 2019-06-27 | End: 2019-06-27 | Stop reason: HOSPADM

## 2019-06-26 RX ORDER — ONDANSETRON 2 MG/ML
4 INJECTION INTRAMUSCULAR; INTRAVENOUS EVERY 6 HOURS PRN
Status: DISCONTINUED | OUTPATIENT
Start: 2019-06-26 | End: 2019-06-27 | Stop reason: HOSPADM

## 2019-06-26 RX ORDER — IPRATROPIUM BROMIDE AND ALBUTEROL SULFATE 2.5; .5 MG/3ML; MG/3ML
1 SOLUTION RESPIRATORY (INHALATION)
Status: DISCONTINUED | OUTPATIENT
Start: 2019-06-26 | End: 2019-06-27 | Stop reason: HOSPADM

## 2019-06-26 RX ORDER — PANTOPRAZOLE SODIUM 40 MG/1
40 TABLET, DELAYED RELEASE ORAL
Status: DISCONTINUED | OUTPATIENT
Start: 2019-06-27 | End: 2019-06-27 | Stop reason: HOSPADM

## 2019-06-26 RX ORDER — IPRATROPIUM BROMIDE AND ALBUTEROL SULFATE 2.5; .5 MG/3ML; MG/3ML
1 SOLUTION RESPIRATORY (INHALATION) ONCE
Status: COMPLETED | OUTPATIENT
Start: 2019-06-26 | End: 2019-06-26

## 2019-06-26 RX ORDER — FAMOTIDINE 20 MG/1
20 TABLET, FILM COATED ORAL DAILY
Status: DISCONTINUED | OUTPATIENT
Start: 2019-06-26 | End: 2019-06-27 | Stop reason: HOSPADM

## 2019-06-26 RX ORDER — CHOLESTYRAMINE 4 G/9G
1 POWDER, FOR SUSPENSION ORAL 2 TIMES DAILY
COMMUNITY
End: 2019-08-20 | Stop reason: ALTCHOICE

## 2019-06-26 RX ORDER — BUDESONIDE 0.5 MG/2ML
1 INHALANT ORAL 2 TIMES DAILY
Status: DISCONTINUED | OUTPATIENT
Start: 2019-06-26 | End: 2019-06-27 | Stop reason: HOSPADM

## 2019-06-26 RX ORDER — TROSPIUM CHLORIDE 20 MG/1
20 TABLET, FILM COATED ORAL
Status: DISCONTINUED | OUTPATIENT
Start: 2019-06-26 | End: 2019-06-27 | Stop reason: HOSPADM

## 2019-06-26 RX ORDER — DOXYCYCLINE HYCLATE 100 MG/1
100 CAPSULE ORAL EVERY 12 HOURS
Status: DISCONTINUED | OUTPATIENT
Start: 2019-06-26 | End: 2019-06-27 | Stop reason: HOSPADM

## 2019-06-26 RX ORDER — METOPROLOL SUCCINATE 25 MG/1
12.5 TABLET, EXTENDED RELEASE ORAL DAILY
Status: DISCONTINUED | OUTPATIENT
Start: 2019-06-26 | End: 2019-06-27 | Stop reason: HOSPADM

## 2019-06-26 RX ORDER — ATORVASTATIN CALCIUM 40 MG/1
40 TABLET, FILM COATED ORAL DAILY
Status: DISCONTINUED | OUTPATIENT
Start: 2019-06-26 | End: 2019-06-27 | Stop reason: HOSPADM

## 2019-06-26 RX ORDER — NICOTINE 21 MG/24HR
1 PATCH, TRANSDERMAL 24 HOURS TRANSDERMAL DAILY
Status: DISCONTINUED | OUTPATIENT
Start: 2019-06-26 | End: 2019-06-27 | Stop reason: HOSPADM

## 2019-06-26 RX ORDER — METHYLPREDNISOLONE SODIUM SUCCINATE 125 MG/2ML
125 INJECTION, POWDER, LYOPHILIZED, FOR SOLUTION INTRAMUSCULAR; INTRAVENOUS ONCE
Status: COMPLETED | OUTPATIENT
Start: 2019-06-26 | End: 2019-06-26

## 2019-06-26 RX ORDER — DICYCLOMINE HYDROCHLORIDE 10 MG/1
10 CAPSULE ORAL 4 TIMES DAILY PRN
Status: DISCONTINUED | OUTPATIENT
Start: 2019-06-26 | End: 2019-06-27 | Stop reason: HOSPADM

## 2019-06-26 RX ORDER — DICYCLOMINE HYDROCHLORIDE 10 MG/1
10 CAPSULE ORAL 4 TIMES DAILY PRN
COMMUNITY
End: 2019-08-20 | Stop reason: ALTCHOICE

## 2019-06-26 RX ORDER — CETIRIZINE HYDROCHLORIDE 10 MG/1
10 TABLET ORAL DAILY
Status: ON HOLD | COMMUNITY
End: 2019-06-26

## 2019-06-26 RX ORDER — TRAZODONE HYDROCHLORIDE 50 MG/1
50 TABLET ORAL NIGHTLY
Status: DISCONTINUED | OUTPATIENT
Start: 2019-06-26 | End: 2019-06-27 | Stop reason: HOSPADM

## 2019-06-26 RX ORDER — SODIUM CHLORIDE 0.9 % (FLUSH) 0.9 %
10 SYRINGE (ML) INJECTION EVERY 12 HOURS SCHEDULED
Status: DISCONTINUED | OUTPATIENT
Start: 2019-06-26 | End: 2019-06-27 | Stop reason: HOSPADM

## 2019-06-26 RX ORDER — TOLTERODINE TARTRATE 2 MG/1
2 TABLET, EXTENDED RELEASE ORAL 2 TIMES DAILY
COMMUNITY

## 2019-06-26 RX ORDER — POTASSIUM CHLORIDE 20 MEQ/1
20 TABLET, EXTENDED RELEASE ORAL 2 TIMES DAILY WITH MEALS
Status: DISCONTINUED | OUTPATIENT
Start: 2019-06-26 | End: 2019-06-27 | Stop reason: HOSPADM

## 2019-06-26 RX ORDER — CHOLESTYRAMINE 4 G/9G
1 POWDER, FOR SUSPENSION ORAL 2 TIMES DAILY
Status: DISCONTINUED | OUTPATIENT
Start: 2019-06-26 | End: 2019-06-27 | Stop reason: HOSPADM

## 2019-06-26 RX ORDER — HYDROCHLOROTHIAZIDE 25 MG/1
50 TABLET ORAL DAILY
Status: DISCONTINUED | OUTPATIENT
Start: 2019-06-26 | End: 2019-06-27 | Stop reason: HOSPADM

## 2019-06-26 RX ADMIN — BUDESONIDE 1000 MCG: 0.5 INHALANT RESPIRATORY (INHALATION) at 19:00

## 2019-06-26 RX ADMIN — TRAZODONE HYDROCHLORIDE 50 MG: 50 TABLET ORAL at 20:29

## 2019-06-26 RX ADMIN — IPRATROPIUM BROMIDE AND ALBUTEROL SULFATE 1 AMPULE: .5; 3 SOLUTION RESPIRATORY (INHALATION) at 15:32

## 2019-06-26 RX ADMIN — METHYLPREDNISOLONE SODIUM SUCCINATE 125 MG: 125 INJECTION, POWDER, FOR SOLUTION INTRAMUSCULAR; INTRAVENOUS at 10:51

## 2019-06-26 RX ADMIN — POTASSIUM CHLORIDE 20 MEQ: 20 TABLET, EXTENDED RELEASE ORAL at 17:14

## 2019-06-26 RX ADMIN — TROSPIUM CHLORIDE 20 MG: 20 TABLET, FILM COATED ORAL at 17:23

## 2019-06-26 RX ADMIN — Medication 10 ML: at 20:29

## 2019-06-26 RX ADMIN — IPRATROPIUM BROMIDE AND ALBUTEROL SULFATE 1 AMPULE: .5; 3 SOLUTION RESPIRATORY (INHALATION) at 19:00

## 2019-06-26 RX ADMIN — IPRATROPIUM BROMIDE AND ALBUTEROL SULFATE 1 AMPULE: .5; 3 SOLUTION RESPIRATORY (INHALATION) at 10:31

## 2019-06-26 RX ADMIN — IPRATROPIUM BROMIDE AND ALBUTEROL SULFATE 1 AMPULE: .5; 3 SOLUTION RESPIRATORY (INHALATION) at 12:41

## 2019-06-26 RX ADMIN — DOXYCYCLINE 100 MG: 100 INJECTION, POWDER, LYOPHILIZED, FOR SOLUTION INTRAVENOUS at 13:50

## 2019-06-26 RX ADMIN — DOXYCYCLINE HYCLATE 100 MG: 100 CAPSULE ORAL at 17:23

## 2019-06-26 RX ADMIN — MULTIPLE VITAMINS W/ MINERALS TAB 1 TABLET: TAB at 20:29

## 2019-06-26 RX ADMIN — CHOLESTYRAMINE 4 G: 4 POWDER, FOR SUSPENSION ORAL at 20:29

## 2019-06-26 ASSESSMENT — PAIN DESCRIPTION - ORIENTATION: ORIENTATION: MID;UPPER

## 2019-06-26 ASSESSMENT — PAIN DESCRIPTION - LOCATION: LOCATION: CHEST

## 2019-06-26 ASSESSMENT — ENCOUNTER SYMPTOMS
SORE THROAT: 0
ABDOMINAL PAIN: 0
SHORTNESS OF BREATH: 1
RHINORRHEA: 0
VOMITING: 0
COUGH: 1
NAUSEA: 0
BACK PAIN: 0
DIARRHEA: 0

## 2019-06-26 ASSESSMENT — PAIN DESCRIPTION - PAIN TYPE: TYPE: ACUTE PAIN

## 2019-06-26 ASSESSMENT — PAIN SCALES - GENERAL
PAINLEVEL_OUTOF10: 8
PAINLEVEL_OUTOF10: 10

## 2019-06-26 ASSESSMENT — PAIN DESCRIPTION - FREQUENCY
FREQUENCY: CONTINUOUS
FREQUENCY: OTHER (COMMENT)

## 2019-06-26 ASSESSMENT — PAIN DESCRIPTION - DESCRIPTORS
DESCRIPTORS: SHARP
DESCRIPTORS: SHARP

## 2019-06-26 NOTE — PROGRESS NOTES
[address] arrived to room # [phone]. Presented with: COPD exacerbation  Mental Status: Patient is oriented and alert. Vitals:    06/26/19 1453   BP: 118/75   Pulse: 69   Resp: 16   Temp: 97 °F (36.1 °C)   SpO2: 94%     Patient safety contract and falls prevention contract reviewed with patient Yes. Oriented Patient and Family to room. Call light within reach. Yes.   Needs, issues or concerns expressed at this time: no.      Electronically signed by Luz Braun RN on 6/26/2019 at 5:40 PM

## 2019-06-26 NOTE — ED NOTES
Pt fell on 06/14/2019 and developed chest pain on the 15th. She went to see her doctor on the following Monday. Pt states she has been to [facility] as well. She was told she had A-Fib and to see her Cardiologist.  She was seen today in the his office by a nurse and sent to the ER. Pain is worse with deep breathing and movement.        Sukhdev Alexander NEFTALI [facility]  06/26/19 5263

## 2019-06-26 NOTE — ED PROVIDER NOTES
[address] [facility]  eMERGENCY dEPARTMENT eNCOUnter      Pt Name: Norma Armstrong  MRN: [id number]  [place] 12/25/1933  Date of evaluation: 6/26/2019  Provider: Yue Haddad MD    [address] [place] [address]       Chief Complaint   Patient presents with    Chest Pain         HISTORY OF PRESENT ILLNESS   (Location/Symptom, Timing/Onset,Context/Setting, Quality, Duration, Modifying Factors, Severity)  Note limiting factors. Norma Armstrong is a 80 y.o. female who presents to the emergency department for concern of chest pain. Patient states for the past approximate 2 weeks she has had dull constant chest discomfort but occasionally this is a sharp pain. States it is located to the center of her chest nonradiating. No diaphoresis or exertional symptoms. No prior cardiac disease. She does have a history of COPD wears 2 L of oxygen at night and as needed during the day continues to smoke. Was seen at [facility] ER 2 days ago tells me that she had a cardiac work-up and checked her for blood clots and was discharged. No recent stress test or heart cath. HPI    NursingNotes were reviewed. REVIEW OF SYSTEMS    (2-9 systems for level 4, 10 or more for level 5)     Review of Systems   Constitutional: Negative for chills and fever. HENT: Negative for rhinorrhea and sore throat. Respiratory: Positive for cough and shortness of breath. Cardiovascular: Positive for chest pain. Negative for leg swelling. Gastrointestinal: Negative for abdominal pain, diarrhea, nausea and vomiting. Genitourinary: Negative for dysuria, frequency and urgency. Musculoskeletal: Negative for back pain and neck pain. Neurological: Negative for dizziness and headaches. All other systems reviewed and are negative.            PAST MEDICALHISTORY     Past Medical History:   Diagnosis Date    Abnormal EKG 11/14/2017    Angina     Arthritis     Asthma     Atherosclerosis     Brain aneurysm     with a coil    Bulging disc     Nasal route daily     HYDROCODONE-ACETAMINOPHEN (NORCO)  MG PER TABLET    Take 1 tablet by mouth every 6 hours as needed for Pain. INDAPAMIDE (LOZOL) 2.5 MG TABLET    Take 2.5 mg by mouth every morning. IPRATROPIUM-ALBUTEROL (DUONEB) 0.5-2.5 (3) MG/3ML SOLN NEBULIZER SOLUTION    Inhale 1 vial into the lungs 4 times daily    LINACLOTIDE (LINZESS) 145 MCG CAPSULE    Take 145 mcg by mouth every morning (before breakfast)    METOPROLOL SUCCINATE (TOPROL XL) 25 MG EXTENDED RELEASE TABLET    Take 0.5 tablets by mouth daily    MOMETASONE (ASMANEX) 220 MCG/INH INHALER    Inhale 2 puffs into the lungs 2 times daily     MULTIPLE VITAMINS-MINERALS (PRESERVISION AREDS 2 PO)    Take by mouth 2 times daily    OMEPRAZOLE (PRILOSEC) 20 MG DELAYED RELEASE CAPSULE    Take 20 mg by mouth 2 times daily    OXYGEN CONCENTRATOR    Indications: uses 8-10 hrs nightly    POTASSIUM CHLORIDE (KLOR-CON) 10 MEQ CR TABLET    Take 2 tablets twice daily    RIVAROXABAN (XARELTO) 20 MG TABS TABLET    Take 20 mg by mouth daily (with breakfast)    SALMETEROL (SEREVENT DISKUS) 50 MCG/DOSE DISKUS INHALER    Inhale 1 puff into the lungs daily     TOLTERODINE (DETROL) 2 MG TABLET    Take 2 mg by mouth 2 times daily    TRAZODONE (DESYREL) 50 MG TABLET    Take 50 mg by mouth nightly       ALLERGIES     Azithromycin; Baclofen; Ibuprofen; Medrol [methylprednisolone]; Neosporin [neomycin-bacitracin zn-polymyx]; Nsaids; Other; Oxycontin [oxycodone hcl]; Pcn [penicillins];  Soma [carisoprodol]; and Sulfa antibiotics    FAMILY HISTORY       Family History   Problem Relation Age of Onset    Diabetes Mother     Heart Disease Mother     Cancer Mother     Other Mother     Heart Disease Brother         2    Stroke Brother     Diabetes Brother     Cancer Brother           SOCIAL HISTORY       Social History     Socioeconomic History    Marital status: Single     Spouse name: None    Number of children: None    Years of education: None    Highest education level: None   Occupational History    None   Social Needs    Financial resource strain: None    Food insecurity:     Worry: None     Inability: None    Transportation needs:     Medical: None     Non-medical: None   Tobacco Use    Smoking status: Former Smoker     Packs/day: 0.00     Types: Cigarettes    Smokeless tobacco: Never Used   Substance and Sexual Activity    Alcohol use: No    Drug use: No    Sexual activity: None   Lifestyle    Physical activity:     Days per week: None     Minutes per session: None    Stress: None   Relationships    Social connections:     Talks on phone: None     Gets together: None     Attends Jewish service: None     Active member of club or organization: None     Attends meetings of clubs or organizations: None     Relationship status: None    Intimate partner violence:     Fear of current or ex partner: None     Emotionally abused: None     Physically abused: None     Forced sexual activity: None   Other Topics Concern    None   Social History Narrative    None       SCREENINGS    [place] Coma Scale  Eye Opening: Spontaneous  Best Verbal Response: Oriented  Best Motor Response: Obeys commands  [place] Coma Scale Score: 15        PHYSICAL EXAM    (up to 7 for level 4, 8 or more for level 5)     ED Triage Vitals [06/26/19 0938]   BP Temp Temp Source Pulse Resp SpO2 Height Weight   128/60 97.3 °F (36.3 °C) Tympanic 62 18 96 % 5' 5\" (1.651 m) 138 lb (62.6 kg)       Physical Exam   Constitutional: She is oriented to person, place, and time. She appears well-developed and well-nourished. No distress. HENT:   Head: Normocephalic and atraumatic. Right Ear: External ear normal.   Left Ear: External ear normal.   Eyes: Conjunctivae and EOM are normal.   Neck: Normal range of motion. No tracheal deviation present. Cardiovascular: Normal rate, regular rhythm, normal heart sounds and intact distal pulses. No murmur heard.   Pulmonary/Chest: No respiratory 06/26/2019 02:04:13 PM      PATIENT REFERRED TO:  GALEN Bailey - CNP  [address] [place] [address]  [phone]            DISCHARGE MEDICATIONS:  New Prescriptions    No medications on file          (Please note that portions of this note were completed with a voice recognition program.  Efforts were made to edit thedictations but occasionally words are mis-transcribed.)    Christiana Platt MD (electronically signed)  Attending Emergency Physician       Nini Ellis MD  06/26/19 0114

## 2019-06-26 NOTE — PROGRESS NOTES
Contains abnormal data Blood Gas, Arterial   Status:  Final result    Ref Range & Units 06/26/19 1038   pH, Arterial 7.350 - 7.450 7.420    pCO2, Arterial 35.0 - 45.0 mmHg 39.0    pO2, Arterial 80.0 - 100.0 mmHg 71. 0Low     HCO3, Arterial 22.0 - 26.0 mmol/L 25.3    Base Excess, Arterial -2.0 - 2.0 mmol/L 0.8    Hemoglobin, Art, Extended 12.0 - 16.0 g/dL 11.3Low     O2 Sat, Arterial >92 % 92.7    Carboxyhgb, Arterial 0.0 - 5.0 % 3.0    Comment:      0.0-1.5   (Smokers 1.5-5.0)    Methemoglobin, Arterial <1.5 % 1.0    O2 Content, Arterial Not Established mL/dL 14.8    O2 Therapy  Unknown    Resulting Agency  [facility]        Specimen Collected: 06/26/19 1038 Last Resulted: 06/26/19 1039 View Other Order Details        Pt on room air, RR 24 site RR at+

## 2019-06-26 NOTE — PROGRESS NOTES
Left Lower Extremity Venous Study was performed on 06/26/2019    No sign of DVT, SVT or Reflux noted in areas visualized at this time. Final Report Pending.

## 2019-06-26 NOTE — ED NOTES
Called pharmacy to see when vibramycin would be down here         Hortencia Gilliam, SHEA  06/26/19 1924 [facility] Kenneth Capps RN  06/26/19 1108

## 2019-06-26 NOTE — ED NOTES
[address] [facility] @ [facility] @ [phone] to request medical records for the patient.       Payal Serrano  06/26/19 1012

## 2019-06-26 NOTE — CONSULTS
about having severe left upper leg and hip discomfort about a year ago and apparently was told she had a blood clot and that she must go to the hospital in [facility]. We do not have any of those records available to us at the present time. As the hospitalist Notes the patient is on Xarelto but the reasons for this remains a bit unclear. If any assumptions are to be made I would assume that  she had a DVT but I cannot say this with certainty. The patient is short of breath with activity but has had no PND or orthopnea. She has had no nausea or vomiting. There have been no feeling of palpitations, no syncope or near syncope. CARDIAC RISK PROFILE:    Risk Factor Yes / No / Unknown       Cigarette Use Remote    Diabetes Mellitus No    Family History of CAD See below    Hypercholesteremia See below    Hypertension See below           Cardiac Specific Problems:    Specialty Problems        Cardiology Problems    Aneurysm (HCC)        Migraine headache        Brain aneurysm        Mild CAD                PRIOR CARDIAC PROBLEM LIST  (IF APPLICABLE):    No previous cardiac history      Past Medical History:  Past Medical History:   Diagnosis Date    Abnormal EKG 11/14/2017    Angina     Arthritis     Asthma     Atherosclerosis     Brain aneurysm     with a coil    Bulging disc     Cellulitis of right groin 12/11/2017    Chest pain     COPD (chronic obstructive pulmonary disease) (HCC)     Diverticulitis     GERD (gastroesophageal reflux disease)     has colostomy bag    Hiatal hernia     Irregular heartbeat     Irritable bowel syndrome     Mild CAD 12/11/2017    Mixed hyperlipidemia 11/14/2017    Osteoporosis     Palpitations 1/3/2018    Rheumatoid arthritis(714.0)     Scoliosis     Shingles     Spinal stenosis     Spondylisthesis     L5-6    Tobacco abuse 11/14/2017    She has smoked since she was 12years old, continues to smoke.      Ulcer     Uterine cancer [facility])        Past Surgical History:  Past Surgical History:   Procedure Laterality Date    ABDOMINAL ADHESION SURGERY      APPENDECTOMY      CARDIAC CATHETERIZATION  12/06/2017    EF > 60%     [facility]    CHOLECYSTECTOMY      COLON SURGERY      colostomy    ELBOW SURGERY      RIGHT    HAND SURGERY      RIGHT    HERNIA REPAIR      HYSTERECTOMY      SHOULDER SURGERY      RIGHT    SKIN CANCER EXCISION  1962    TONSILLECTOMY AND ADENOIDECTOMY      TUBAL LIGATION      TYMPANOSTOMY TUBE PLACEMENT      LEFT       Home Medications:   Prior to Admission medications    Medication Sig Start Date End Date Taking? Authorizing Provider   HYDROcodone-acetaminophen (NORCO)  MG per tablet Take 1 tablet by mouth every 6 hours as needed for Pain. Historical Provider, MD   rivaroxaban (XARELTO) 20 MG TABS tablet Take 20 mg by mouth daily (with breakfast)    Historical Provider, MD   doxycycline hyclate (VIBRA-TABS) 100 MG tablet Take 100 mg by mouth 2 times daily    Historical Provider, MD   tolterodine (DETROL) 2 MG tablet Take 2 mg by mouth 2 times daily    Historical Provider, MD   famotidine (PEPCID) 20 MG tablet Take 20 mg by mouth daily    Historical Provider, MD   Multiple Vitamins-Minerals (PRESERVISION AREDS 2 PO) Take by mouth 2 times daily    Historical Provider, MD   mometasone (ASMANEX) 220 MCG/INH inhaler Inhale 2 puffs into the lungs 2 times daily     Historical Provider, MD   traZODone (DESYREL) 50 MG tablet Take 50 mg by mouth nightly    Historical Provider, MD   Cream Base CREA [place] Pain Cream- Lidocaine only- Multiallergies-Apply 1-2 pumps to affected area 3-4 times a day.  [id number]. 11/28/18   GALEN Buck   albuterol sulfate  (90 Base) MCG/ACT inhaler Inhale 2 puffs into the lungs 4 times daily     Historical Provider, MD   ferrous sulfate 325 (65 Fe) MG tablet Take 325 mg by mouth daily (with breakfast)    Historical Provider, MD   metoprolol succinate (TOPROL XL) 25 MG extended release tablet Take 0.5 tablets by mouth daily  Patient taking differently: Take 25 mg by mouth daily  12/21/17   GALEN Pretty   fluticasone (VERAMYST) 27.5 MCG/SPRAY nasal spray 1 spray by Nasal route daily     Historical Provider, MD   omeprazole (PRILOSEC) 20 MG delayed release capsule Take 20 mg by mouth 2 times daily    Historical Provider, MD   linaclotide (LINZESS) 145 MCG capsule Take 145 mcg by mouth every morning (before breakfast)    Historical Provider, MD   ipratropium-albuterol (DUONEB) 0.5-2.5 (3) MG/3ML SOLN nebulizer solution Inhale 1 vial into the lungs 4 times daily    Historical Provider, MD   Oxygen Concentrator Indications: uses 8-10 hrs nightly    Historical Provider, MD   potassium chloride (KLOR-CON) 10 MEQ CR tablet Take 2 tablets twice daily    Historical Provider, MD   aspirin 325 MG EC tablet Take 325 mg by mouth daily. Historical Provider, MD   salmeterol (SEREVENT DISKUS) 50 MCG/DOSE diskus inhaler Inhale 1 puff into the lungs daily     Historical Provider, MD   indapamide (LOZOL) 2.5 MG tablet Take 2.5 mg by mouth every morning. Historical Provider, MD   atorvastatin (LIPITOR) 40 MG tablet Take 40 mg by mouth daily.       Historical Provider, MD        Facility Administered Medications:    sodium chloride flush  10 mL Intravenous 2 times per day    ipratropium-albuterol  1 ampule Inhalation Q4H WA    doxycycline hyclate  100 mg Oral Q12H    atorvastatin  40 mg Oral Daily    famotidine  20 mg Oral Daily    [START ON 6/27/2019] ferrous sulfate  325 mg Oral Daily with breakfast    fluticasone  2 spray Each Nostril Daily    [START ON 6/27/2019] linaclotide  145 mcg Oral QAM AC    hydrochlorothiazide  50 mg Oral Daily    metoprolol succinate  12.5 mg Oral Daily    budesonide  1 mg Nebulization BID    therapeutic multivitamin-minerals  1 tablet Oral BID    potassium chloride  20 mEq Oral BID WC    [START ON 6/27/2019] pantoprazole  40 mg Oral QAM AC    [START ON 6/27/2019] History:  Family History   Problem Relation Age of Onset    Diabetes Mother     Heart Disease Mother     Cancer Mother     Other Mother     Heart Disease Brother         2    Stroke Brother     Diabetes Brother     Cancer Brother          REVIEW OF SYSTEMS:     Except as noted in the HPI, all other systems are negative  CONSTITUTIONAL:  No fevers, chills, night sweats, or weight loss. HEENT:  No vision loss, double vision, blurred vision, or tearing. No hearing loss, tinnitus, or infection. No nasal discharge or epistaxis. No dysphagia. RESPIRATORY:  Chronic shortness of breath, cough, and scant sputum production. No history of TB exposure. CARDIOVASCULAR:  No hypertension, hypotension, anginal type chest pain, dizziness, or syncope. No history of palpitations. PERIPHERAL VASCULAR:  No history of claudication. GASTROINTESTINAL:  No nausea, vomiting, diarrhea, or constipation. No reflux or gastroesophageal reflux disease. GENITOURINARY:  No dysuria, urgency, frequency, or history of urinary tract infections. No history of nephrolithiasis or renal insufficiency. NEUROLOGIC:  No history of cerebrovascular accident, transient ischemic attack, or amaurosis fugax. No history of seizure disorder. INTEGUMENTARY:  No history of nonhealing wounds or skin cancer removal.  PSYCHIATRIC:  No excessive anxiety or depression. ENDOCRINE:  No polyuria, polydipsia, or significant weight gain. No heat or cold intolerance. MUSCULOSKELETAL:  No limit to range of motion of joints or swelling of limbs. HEMATOLOGIC:  Questionable history of DVT, questionable PE, no anemia. All other review of systems is negative. PHYSICAL EXAMINATION:     /75   Pulse 69   Temp 97 °F (36.1 °C) (Temporal)   Resp 16   Ht 5' 5\" (1.651 m)   Wt 138 lb (62.6 kg)   SpO2 94%   BMI 22.96 kg/m²     GENERAL:  Alert and oriented X3 in no apparent distress. Short term and long term memory intact.   Judgement intact. HEENT:  Normocephalic without evidence of old or recent trauma. Sclerae clear. Conjunctivae pink. EOMs intact. Pupils equal and round. Tympanic membranes not visualized. No epistaxis, runny nose. No lesions on lips or buccal mucosa. Tongue protrudes in midline and is well papillated. NECK:  Supple without mass or JVD. Carotid pulses 2+ to palpation bilaterally without bruit. No thyromegaly noted. CARDIOVASCULAR:  Regular rate and rhythm without S3, S4 or murmur. PULMONARY:  Equal bilateral expansion. Clear to auscultation and percussion. Decreased breath sounds  ABDOMEN:  Soft, non tender. Bowel sounds X4 quadrants. No hepatomegaly, splenomegaly or palpable mass. MUSCULOSKELETAL:  Negative. Does have anterior chest wall tenderness however. UPPER EXTREMITIES:  Radial pulses palpable bilaterally. No cyanosis, clubbing, or edema. LOWER EXTREMITIES:  Femoral, popliteal, dorsalis pedis, and posterior tibial pulses 2+ to palpation bilaterally. No cyanosis, clubbing, or edema or signs of atheroembolic event. NEUROLOGIC:  Physiologic. SKIN:  Warm, dry, intact.       LABORATORY EVALUATION & TESTING:    I have personally reviewed and interpreted the results of the following diagnostic testing      EKG and or Telemetry:  which was personally reviewed me:  Sinus rhythm, 69 bpm    Troponin:  Not obtained for myocardial necrosis    CBC:   Recent Labs     06/26/19  1000   WBC 9.4   HGB 12.0   HCT 37.2   .4*        BMP:   Recent Labs     06/26/19  1000 06/26/19  1038     --    K 4.5 3.8   CL 97*  --    CO2 29  --    BUN 19  --    CREATININE 0.8  --      Cardiac Enzymes:   Recent Labs     06/26/19  1000 06/26/19  1237   TROPONINI <0.01 <0.01     PT/INR:   Recent Labs     06/26/19  1000   PROTIME 27.8*   INR 2.68*     APTT:   Recent Labs     06/26/19  1000   APTT 42.3*     Liver Profile:  Lab Results   Component Value Date    AST 13 06/26/2019    ALT 10 06/26/2019    BILITOT 0.4 06/26/2019    ALKPHOS 86 06/26/2019   No results found for: CHOL, HDL, TRIG  TSH:  Lab Results   Component Value Date    TSH 1.510 11/13/2017     UA: No results found for: NITRITE, COLORU, PHUR, LABCAST, WBCUA, RBCUA, MUCUS, TRICHOMONAS, YEAST, BACTERIA, CLARITYU, SPECGRAV, LEUKOCYTESUR, UROBILINOGEN, BILIRUBINUR, BLOODU, GLUCOSEU, AMORPHOUS          ALL THE CARDIOLOGY PROBLEMS ARE LISTED ABOVE; HOWEVER, THE FOLLOWING SPECIFIC CARDIAC PROBLEMS WERE ADDRESSED AND TREATED DURING THE HOSPITAL VISIT TODAY:       Cardiac Specific Problem / Diagnosis  Discussion / Medical Decision Making Plan          1. Chest pain with atypical features   are worsening   This is constant pain with pleuritic and positional features. It is almost certainly noncardiac. Will order a Lexiscan nuclear stress test for tomorrow. Dobutamine stress echo in the past was falsely positive. We will also need to get an echocardiogram to rule out what could possibly be hemorrhagic pericarditis from trauma. 2. Possible venous thromboembolic disease   show no change   Patient is on xarelto  Need to obtain pertinent records. 3. COPD   show no change   Continue medications         PLAN:    1. Continue present medications except for changes as noted above  2. Continue to monitor rhythm  3. Further orders per clinical course. Discussed with patient and nursing. Electronically signed by MIKAYLA Cruz MD on 6/26/19    [facility]

## 2019-06-26 NOTE — PROGRESS NOTES
Patient sent to the ER with complaints of chest pain radiating to her back. Daughter with patient and taking her to the ER.

## 2019-06-26 NOTE — H&P
SURGERY      RIGHT    HERNIA REPAIR      HYSTERECTOMY      SHOULDER SURGERY      RIGHT    SKIN CANCER EXCISION  1962    TONSILLECTOMY AND ADENOIDECTOMY      TUBAL LIGATION      TYMPANOSTOMY TUBE PLACEMENT      LEFT       Medications Prior to Admission:    Prior to Admission medications    Medication Sig Start Date End Date Taking? Authorizing Provider   HYDROcodone-acetaminophen (NORCO)  MG per tablet Take 1 tablet by mouth every 6 hours as needed for Pain. Historical Provider, MD   rivaroxaban (XARELTO) 20 MG TABS tablet Take 20 mg by mouth daily (with breakfast)    Historical Provider, MD   doxycycline hyclate (VIBRA-TABS) 100 MG tablet Take 100 mg by mouth 2 times daily    Historical Provider, MD   tolterodine (DETROL) 2 MG tablet Take 2 mg by mouth 2 times daily    Historical Provider, MD   famotidine (PEPCID) 20 MG tablet Take 20 mg by mouth daily    Historical Provider, MD   Multiple Vitamins-Minerals (PRESERVISION AREDS 2 PO) Take by mouth 2 times daily    Historical Provider, MD   mometasone (ASMANEX) 220 MCG/INH inhaler Inhale 2 puffs into the lungs 2 times daily     Historical Provider, MD   traZODone (DESYREL) 50 MG tablet Take 50 mg by mouth nightly    Historical Provider, MD   Cream Base CREA [facility] Pain Cream- Lidocaine only- Multiallergies-Apply 1-2 pumps to affected area 3-4 times a day.  [phone]. 11/28/18   GALEN Rodas   albuterol sulfate  (90 Base) MCG/ACT inhaler Inhale 2 puffs into the lungs 4 times daily     Historical Provider, MD   ferrous sulfate 325 (65 Fe) MG tablet Take 325 mg by mouth daily (with breakfast)    Historical Provider, MD   metoprolol succinate (TOPROL XL) 25 MG extended release tablet Take 0.5 tablets by mouth daily  Patient taking differently: Take 25 mg by mouth daily  12/21/17   GALEN Hawkins   fluticasone (VERAMYST) 27.5 MCG/SPRAY nasal spray 1 spray by Nasal route daily     Historical Provider, MD   omeprazole (PRILOSEC) 20 MG cooperative with exam  HEENT: atraumatic, eyes with clear conjunctiva and normal lids, pupils and irises normal, external ears and nose are normal, lips normal. Neck without masses, lympadenopathy, bruit, thyroid normal  Lungs: no increased work of breathing, diminished breath sounds bilaterally  Heart: regular rate and rhythm, S1, S2 normal, no murmur, click, rub or gallop, chest pain reproducible on palpation  Abdomen: soft, non-tender; bowel sounds normal; no masses,  no organomegaly  Extremities: extremities normal without clubbing, atraumatic, no cyanosis or edema  Neurologic: No focal neurologic deficits, normal sensation, alert and oriented, affect and mood appropriate. Skin: no rashes, nodules. CBC:   Recent Labs     06/26/19  1000   WBC 9.4   HGB 12.0        BMP:    Recent Labs     06/26/19  1000 06/26/19  1038     --    K 4.5 3.8   CL 97*  --    CO2 29  --    BUN 19  --    CREATININE 0.8  --    GLUCOSE 83  --      Hepatic:   Recent Labs     06/26/19  1000   AST 13   ALT 10   BILITOT 0.4   ALKPHOS 86     Troponin T:   Recent Labs     06/26/19  1000 06/26/19  1237   TROPONINI <0.01 <0.01     ABGs: No results for input(s): PH, PCO2, PO2, HCO3, BE, O2SAT in the last 72 hours. INR:   Recent Labs     06/26/19  1000   INR 2.68*     Lactic Acid: No results for input(s): LACTA in the last 72 hours. -----------------------------------------------------------------  PA/lat CXR: no acute process    EKG: NSR, no acute ischemia or infarction    Assessment and Plan   Principal Problem:    COPD (chronic obstructive pulmonary disease) (HCC)  Active Problems:    Chest pain    Tobacco abuse    Leg edema, left  Resolved Problems:    * No resolved hospital problems. *    Admit for observation to PCU. Serial troponins and telemetry. Patient's chest pain is reproducible on palpation and very likely musculoskeletal, related to her recent fall. Norco PRN chest wall pain.     Nebulized breathing treatments, supplemental oxygen as needed, steroids. Patient has methylprednisolone on her allergy list but tolerated a dose in ED without issue. Nicotine patch if necessary. Venous Doppler performed in ED, showing no DVT. Home medications reconciled.     Kristi Rudolph DO  Admitting Hospitalist

## 2019-06-26 NOTE — PROGRESS NOTES
4 Eyes Skin Assessment    Vale Sandifer is being assessed upon: Admission    I agree that I, [facility], along with Sebastián Gonzalez (either 2 RN's or 1 LPN and 1 RN) have performed a thorough Head to Toe Skin Assessment on the patient. ALL assessment sites listed below have been assessed. Areas assessed by both nurses:     [x]   Head, Face, and Ears   [x]   Shoulders, Back, and Chest  [x]   Arms, Elbows, and Hands   [x]   Coccyx, Sacrum, and Ischium  [x]   Legs, Feet, and Heels    Does the Patient have Skin Breakdown?  No    Arnoldo Prevention initiated: No  Wound Care Orders initiated: HA Solomon consulted for Pressure Injury (Stage 3,4, Unstageable, DTI, NWPT, and Complex wounds) and New or Established Ostomies: Yes        Primary Nurse eSignature: Omega Ross RN on 6/26/2019 at 5:41 PM      Co-Signer eSignature: Electronically signed by Harvin Holter, RN on 6/26/19 at 7:09 PM

## 2019-06-27 ENCOUNTER — APPOINTMENT (OUTPATIENT)
Dept: NUCLEAR MEDICINE | Age: 84
End: 2019-06-27
Payer: MEDICARE

## 2019-06-27 VITALS
HEIGHT: 65 IN | DIASTOLIC BLOOD PRESSURE: 58 MMHG | SYSTOLIC BLOOD PRESSURE: 112 MMHG | WEIGHT: 134.6 LBS | BODY MASS INDEX: 22.42 KG/M2 | HEART RATE: 69 BPM | RESPIRATION RATE: 20 BRPM | OXYGEN SATURATION: 98 % | TEMPERATURE: 97.2 F

## 2019-06-27 PROBLEM — Z51.5 PALLIATIVE CARE PATIENT: Status: ACTIVE | Noted: 2019-06-27

## 2019-06-27 LAB
ANION GAP SERPL CALCULATED.3IONS-SCNC: 15 MMOL/L (ref 7–19)
BUN BLDV-MCNC: 24 MG/DL (ref 8–23)
CALCIUM SERPL-MCNC: 9 MG/DL (ref 8.8–10.2)
CHLORIDE BLD-SCNC: 100 MMOL/L (ref 98–111)
CO2: 22 MMOL/L (ref 22–29)
CREAT SERPL-MCNC: 0.8 MG/DL (ref 0.5–0.9)
GFR NON-AFRICAN AMERICAN: >60
GLUCOSE BLD-MCNC: 139 MG/DL (ref 74–109)
HCT VFR BLD CALC: 33.7 % (ref 37–47)
HEMOGLOBIN: 11.1 G/DL (ref 12–16)
LV EF: 58 %
LVEF MODALITY: NORMAL
MCH RBC QN AUTO: 32.6 PG (ref 27–31)
MCHC RBC AUTO-ENTMCNC: 32.9 G/DL (ref 33–37)
MCV RBC AUTO: 98.8 FL (ref 81–99)
PDW BLD-RTO: 13.8 % (ref 11.5–14.5)
PLATELET # BLD: 203 K/UL (ref 130–400)
PMV BLD AUTO: 9.3 FL (ref 9.4–12.3)
POTASSIUM REFLEX MAGNESIUM: 4.3 MMOL/L (ref 3.5–5)
RBC # BLD: 3.41 M/UL (ref 4.2–5.4)
SODIUM BLD-SCNC: 137 MMOL/L (ref 136–145)
TROPONIN: <0.01 NG/ML (ref 0–0.03)
WBC # BLD: 11.4 K/UL (ref 4.8–10.8)

## 2019-06-27 PROCEDURE — 6360000002 HC RX W HCPCS: Performed by: FAMILY MEDICINE

## 2019-06-27 PROCEDURE — 6370000000 HC RX 637 (ALT 250 FOR IP): Performed by: FAMILY MEDICINE

## 2019-06-27 PROCEDURE — 6370000000 HC RX 637 (ALT 250 FOR IP): Performed by: INTERNAL MEDICINE

## 2019-06-27 PROCEDURE — A9500 TC99M SESTAMIBI: HCPCS | Performed by: INTERNAL MEDICINE

## 2019-06-27 PROCEDURE — 80048 BASIC METABOLIC PNL TOTAL CA: CPT

## 2019-06-27 PROCEDURE — 99217 PR OBSERVATION CARE DISCHARGE MANAGEMENT: CPT | Performed by: INTERNAL MEDICINE

## 2019-06-27 PROCEDURE — G0378 HOSPITAL OBSERVATION PER HR: HCPCS

## 2019-06-27 PROCEDURE — 36415 COLL VENOUS BLD VENIPUNCTURE: CPT

## 2019-06-27 PROCEDURE — 93017 CV STRESS TEST TRACING ONLY: CPT

## 2019-06-27 PROCEDURE — 78452 HT MUSCLE IMAGE SPECT MULT: CPT

## 2019-06-27 PROCEDURE — 2700000000 HC OXYGEN THERAPY PER DAY

## 2019-06-27 PROCEDURE — 85027 COMPLETE CBC AUTOMATED: CPT

## 2019-06-27 PROCEDURE — 96376 TX/PRO/DX INJ SAME DRUG ADON: CPT

## 2019-06-27 PROCEDURE — 84484 ASSAY OF TROPONIN QUANT: CPT

## 2019-06-27 PROCEDURE — 93306 TTE W/DOPPLER COMPLETE: CPT

## 2019-06-27 PROCEDURE — 94640 AIRWAY INHALATION TREATMENT: CPT

## 2019-06-27 PROCEDURE — 99214 OFFICE O/P EST MOD 30 MIN: CPT | Performed by: INTERNAL MEDICINE

## 2019-06-27 PROCEDURE — 3430000000 HC RX DIAGNOSTIC RADIOPHARMACEUTICAL: Performed by: INTERNAL MEDICINE

## 2019-06-27 PROCEDURE — 2580000003 HC RX 258: Performed by: FAMILY MEDICINE

## 2019-06-27 RX ORDER — PREDNISONE 20 MG/1
20 TABLET ORAL DAILY
Status: DISCONTINUED | OUTPATIENT
Start: 2019-06-27 | End: 2019-06-27 | Stop reason: HOSPADM

## 2019-06-27 RX ORDER — PREDNISONE 1 MG/1
5 TABLET ORAL DAILY
Qty: 3 TABLET | Refills: 0 | Status: SHIPPED | OUTPATIENT
Start: 2019-07-06 | End: 2019-07-09

## 2019-06-27 RX ORDER — PREDNISONE 10 MG/1
5 TABLET ORAL DAILY
Status: DISCONTINUED | OUTPATIENT
Start: 2019-07-06 | End: 2019-06-27 | Stop reason: HOSPADM

## 2019-06-27 RX ORDER — PREDNISONE 10 MG/1
15 TABLET ORAL DAILY
Status: DISCONTINUED | OUTPATIENT
Start: 2019-06-30 | End: 2019-06-27 | Stop reason: HOSPADM

## 2019-06-27 RX ORDER — PREDNISONE 10 MG/1
10 TABLET ORAL DAILY
Qty: 3 TABLET | Refills: 0 | Status: SHIPPED | OUTPATIENT
Start: 2019-07-03 | End: 2019-07-06

## 2019-06-27 RX ORDER — PREDNISONE 10 MG/1
10 TABLET ORAL DAILY
Status: DISCONTINUED | OUTPATIENT
Start: 2019-07-03 | End: 2019-06-27 | Stop reason: HOSPADM

## 2019-06-27 RX ORDER — PREDNISONE 1 MG/1
15 TABLET ORAL DAILY
Qty: 9 TABLET | Refills: 0 | Status: SHIPPED | OUTPATIENT
Start: 2019-06-30 | End: 2019-07-03

## 2019-06-27 RX ORDER — NICOTINE 21 MG/24HR
1 PATCH, TRANSDERMAL 24 HOURS TRANSDERMAL DAILY
Qty: 30 PATCH | Refills: 3 | Status: SHIPPED | OUTPATIENT
Start: 2019-06-28

## 2019-06-27 RX ORDER — PREDNISONE 20 MG/1
20 TABLET ORAL DAILY
Qty: 3 TABLET | Refills: 0 | Status: SHIPPED | OUTPATIENT
Start: 2019-06-27 | End: 2019-06-30

## 2019-06-27 RX ADMIN — ATORVASTATIN CALCIUM 40 MG: 40 TABLET, FILM COATED ORAL at 09:55

## 2019-06-27 RX ADMIN — MULTIPLE VITAMINS W/ MINERALS TAB 1 TABLET: TAB at 09:55

## 2019-06-27 RX ADMIN — BUDESONIDE 500 MCG: 0.5 INHALANT RESPIRATORY (INHALATION) at 06:46

## 2019-06-27 RX ADMIN — IPRATROPIUM BROMIDE AND ALBUTEROL SULFATE 1 AMPULE: .5; 3 SOLUTION RESPIRATORY (INHALATION) at 06:46

## 2019-06-27 RX ADMIN — DOXYCYCLINE HYCLATE 100 MG: 100 CAPSULE ORAL at 10:00

## 2019-06-27 RX ADMIN — CHOLESTYRAMINE 4 G: 4 POWDER, FOR SUSPENSION ORAL at 09:56

## 2019-06-27 RX ADMIN — METHYLPREDNISOLONE SODIUM SUCCINATE 40 MG: 40 INJECTION, POWDER, FOR SOLUTION INTRAMUSCULAR; INTRAVENOUS at 09:53

## 2019-06-27 RX ADMIN — TETRAKIS(2-METHOXYISOBUTYLISOCYANIDE)COPPER(I) TETRAFLUOROBORATE 30 MILLICURIE: 1 INJECTION, POWDER, LYOPHILIZED, FOR SOLUTION INTRAVENOUS at 09:45

## 2019-06-27 RX ADMIN — PREDNISONE 20 MG: 20 TABLET ORAL at 16:08

## 2019-06-27 RX ADMIN — PANTOPRAZOLE SODIUM 40 MG: 40 TABLET, DELAYED RELEASE ORAL at 09:54

## 2019-06-27 RX ADMIN — HYDROCHLOROTHIAZIDE 50 MG: 25 TABLET ORAL at 09:54

## 2019-06-27 RX ADMIN — FERROUS SULFATE TAB 325 MG (65 MG ELEMENTAL FE) 325 MG: 325 (65 FE) TAB at 09:54

## 2019-06-27 RX ADMIN — RIVAROXABAN 20 MG: 20 TABLET, FILM COATED ORAL at 09:55

## 2019-06-27 RX ADMIN — POTASSIUM CHLORIDE 20 MEQ: 20 TABLET, EXTENDED RELEASE ORAL at 09:55

## 2019-06-27 RX ADMIN — IPRATROPIUM BROMIDE AND ALBUTEROL SULFATE 1 AMPULE: .5; 3 SOLUTION RESPIRATORY (INHALATION) at 10:25

## 2019-06-27 RX ADMIN — IPRATROPIUM BROMIDE AND ALBUTEROL SULFATE 1 AMPULE: .5; 3 SOLUTION RESPIRATORY (INHALATION) at 14:54

## 2019-06-27 RX ADMIN — TROSPIUM CHLORIDE 20 MG: 20 TABLET, FILM COATED ORAL at 16:04

## 2019-06-27 RX ADMIN — TETRAKIS(2-METHOXYISOBUTYLISOCYANIDE)COPPER(I) TETRAFLUOROBORATE 10 MILLICURIE: 1 INJECTION, POWDER, LYOPHILIZED, FOR SOLUTION INTRAVENOUS at 09:45

## 2019-06-27 RX ADMIN — FLUTICASONE PROPIONATE 2 SPRAY: 50 SPRAY, METERED NASAL at 09:53

## 2019-06-27 RX ADMIN — FAMOTIDINE 20 MG: 20 TABLET ORAL at 09:54

## 2019-06-27 RX ADMIN — Medication 10 ML: at 09:54

## 2019-06-27 RX ADMIN — POTASSIUM CHLORIDE 20 MEQ: 20 TABLET, EXTENDED RELEASE ORAL at 16:04

## 2019-06-27 RX ADMIN — METOPROLOL SUCCINATE 12.5 MG: 25 TABLET, EXTENDED RELEASE ORAL at 09:55

## 2019-06-27 RX ADMIN — TROSPIUM CHLORIDE 20 MG: 20 TABLET, FILM COATED ORAL at 09:54

## 2019-06-27 NOTE — PROGRESS NOTES
Pharmacy Renal Adjustment    Cruz Bender is a 80 y.o. female. Pharmacy has renally adjusted medications per protocol. Recent Labs     06/26/19  1000 06/27/19  0156   BUN 19 24*       Recent Labs     06/26/19  1000 06/27/19  0156   CREATININE 0.8 0.8       Estimated Creatinine Clearance: 46 mL/min (based on SCr of 0.8 mg/dL). Height:   Ht Readings from Last 1 Encounters:   06/26/19 5' 5\" (1.651 m)     Weight:  Wt Readings from Last 1 Encounters:   06/27/19 134 lb 9.6 oz (61.1 kg)       Plan: Adjust the following medications based on renal function:  Change Xarelto to 15mg daily.     DOMINIC HALE, PHARM D, 6/27/2019, 10:24 AM

## 2019-06-27 NOTE — DISCHARGE SUMMARY
Winston Finley  :  1933  MRN:  [id number]    Admit date:  2019  Discharge date:   2019       Admitting Physician:  Dalila Vega MD    Advance Directive: DNR-CC    Consults Made:   IP CONSULT TO CARDIOLOGY  PALLIATIVE CARE EVAL      Primary Care Physician:  Latonya Reyes, APRN - CNP    Discharge Diagnoses:  Principal Problem:    COPD exacerbation ([address]  Active Problems:    Acute chest pain    Tobacco abuse    Leg edema, left    Palliative care patient  Resolved Problems:    * No resolved hospital problems. *            Pertinent Labs:   CBC with DIFF:  Recent Labs     19  1000 19  0156   WBC 9.4 11.4*   RBC 3.67* 3.41*   HGB 12.0 11.1*   HCT 37.2 33.7*   .4* 98.8   MCH 32.7* 32.6*   MCHC 32.3* 32.9*   RDW 13.9 13.8    203   MPV 8.4* 9.3*   NEUTOPHILPCT 54.9  --    LYMPHOPCT 30.1  --    MONOPCT 9.4  --    EOSRELPCT 3.5  --    BASOPCT 0.6  --    NEUTROABS 5.2  --    LYMPHSABS 2.8  --    MONOSABS 0.90  --    EOSABS 0.30  --    BASOSABS 0.10  --        CMP/BMP:  Recent Labs     19  1000 19  1038 19  0156     --  137   K 4.5 3.8 4.3   CL 97*  --  100   CO2 29  --  22   ANIONGAP 10  --  15   GLUCOSE 83  --  139*   BUN 19  --  24*   CREATININE 0.8  --  0.8   LABGLOM >60  --  >60   CALCIUM 9.2  --  9.0   PROT 6.6  --   --    LABALBU 4.1  --   --    BILITOT 0.4  --   --    ALKPHOS 86  --   --    ALT 10  --   --    AST 13  --   --          CRP:  No results for input(s): CRP in the last 72 hours. Sed Rate:  No results for input(s): SEDRATE in the last 72 hours. HgBA1c:  No components found for: HGBA1C  FLP:  No results found for: TRIG, HDL, LDLCALC, LDLDIRECT, LABVLDL  TSH:    Lab Results   Component Value Date    TSH 1.510 2017     Troponin T:   Recent Labs     19  1237 19  1725 19  0156   TROPONINI <0.01 <0.01 <0.01     Pro-BNP: No results for input(s): BNP in the last 72 hours.   INR:   Recent Labs     19  1000 INR 2.68*     ABGs:   Lab Results   Component Value Date    PHART 7.420 06/26/2019    PO2ART 71.0 06/26/2019    LBB8DJX 39.0 06/26/2019     UA:No results for input(s): NITRITE, COLORU, PHUR, LABCAST, WBCUA, RBCUA, MUCUS, TRICHOMONAS, YEAST, BACTERIA, CLARITYU, SPECGRAV, LEUKOCYTESUR, UROBILINOGEN, BILIRUBINUR, BLOODU, GLUCOSEU, AMORPHOUS in the last 72 hours. Invalid input(s): Petra Campbell      Culture Results:    No results for input(s): CXSURG in the last 720 hours. Blood Culture Recent: No results for input(s): BC in the last 720 hours. Cultures:   No results for input(s): CULTURE in the last 72 hours. No results for input(s): BC, Alice Alu in the last 72 hours. No results for input(s): CXSURG in the last 72 hours. No results for input(s): MG, PHOS in the last 72 hours. Recent Labs     06/26/19  1000   AST 13   ALT 10   BILITOT 0.4   ALKPHOS 86           Significant Diagnostic Studies:   Vl Extremity Venous Left    Result Date: 6/26/2019  Vascular Lower Extremities DVT Study Procedure  Demographics   Patient Name    Ashley Arzate  Age                  80                  A   Patient Number  [id number]            Gender               Female   Visit Number    [id number]         Interpreting         Chago England MD                                    Physician   Date of Birth   12/25/1933        Referring Physician  Nini Ellis MD   Accession       [id number]         [address]  Number                                                 RVT  Procedure Type of Study:   Veins:Lower Extremities DVT Study, VL EXTREMITY VENOUS DUPLEX LEFT. Indications for Study:Pain, left lower extremity and Edema, left lower extremity. Allergies   - NSAIDs.   - Oxycontin. - Penicillins.   - Sulfa drugs.   - Other allergy:(Soma, Baclofen, Medrol). Impression   There is no evidence of deep venous thrombosis (DVT) in the left lower  extremity(ies).   There is no evidence of superficial thrombophlebitis of the left lower  extremity(ies). Signature   ----------------------------------------------------------------  Electronically signed by Singh Galan  physician) on 06/26/2019 12:52 PM  ----------------------------------------------------------------  Velocities are measured in cm/s ; Diameters are measured in mm Right Lower Extremities DVT Study Measurements Right 2D Measurements +------------------------------------+----------+---------------+----------+ ! Location                            ! Visualized! Compressibility! Thrombosis! +------------------------------------+----------+---------------+----------+ ! Sapheno Femoral Junction            ! Yes       ! Yes            ! None      ! +------------------------------------+----------+---------------+----------+ ! Common Femoral                      !Yes       ! Yes            ! None      ! +------------------------------------+----------+---------------+----------+ Left Lower Extremities DVT Study Measurements Left 2D Measurements +------------------------------------+----------+---------------+----------+ ! Location                            ! Visualized! Compressibility! Thrombosis! +------------------------------------+----------+---------------+----------+ ! Sapheno Femoral Junction            ! Yes       ! Yes            ! None      ! +------------------------------------+----------+---------------+----------+ ! Common Femoral                      !Yes       ! Yes            ! None      ! +------------------------------------+----------+---------------+----------+ ! Prox Femoral                        !Yes       ! Yes            ! None      ! +------------------------------------+----------+---------------+----------+ ! Mid Femoral                         !Yes       ! Yes            ! None      ! +------------------------------------+----------+---------------+----------+ ! Dist Femoral                        !Yes       ! Yes            ! None ! +------------------------------------+----------+---------------+----------+ ! Deep Femoral                        !Yes       ! Yes            ! None      ! +------------------------------------+----------+---------------+----------+ ! Popliteal                           !Yes       ! Yes            ! None      ! +------------------------------------+----------+---------------+----------+ ! SSV                                 ! Yes       ! Yes            ! None      ! +------------------------------------+----------+---------------+----------+ ! Gastroc                             ! Yes       ! Yes            ! None      ! +------------------------------------+----------+---------------+----------+ ! PTV                                 ! Yes       ! Yes            ! None      ! +------------------------------------+----------+---------------+----------+ ! GSV                                 ! Yes       ! Yes            ! None      ! +------------------------------------+----------+---------------+----------+ ! ATV                                 ! Yes       ! Yes            ! None      ! +------------------------------------+----------+---------------+----------+ ! Peroneal                            !Yes       ! Yes            ! None      ! +------------------------------------+----------+---------------+----------+    Xr Chest Portable    Result Date: 6/26/2019  Exam:   XR CHEST PORTABLE  Date:  6/26/2019 History:  Female, age  80 years; chest pain COMPARISON:  Chest x-ray dated 12/4/2017 Findings : The heart and mediastinum are normal in size. Lungs are without focal infiltrate, mass or effusions. Chronic interstitial lung changes. The bones show no acute pathology. Impression: No acute cardiopulmonary disease.  Signed by Dr Mercedes Springer on 6/26/2019 10:15 AM    Nm Myocardial Spect Rest Exercise Or Rx    Result Date: 6/27/2019  Lexiscan Nuclear Stress Test Report Procedure date:  6/27/2019  Indications: Chest pain Procedure: Stress was performed with injection of 0.4 mg Lexiscan. Vital signs and EKG were monitored. Technetium-99 sestamibi was injected in divided doses, approximately 10 mCi and 30 mCi respectively for rest and stress imaging. The patient was discharged in stable condition. Results: Patient had symptoms of dyspnea and chest discomfort during infusion that resolved in recovery. Baseline EKG showed normal sinus rhythm with PACs. During stress there were no significant EKG changes or rhythm changes. Baseline and peak blood pressures were 117/60, and 114/62 respectively. Baseline and peak heart rates were 70 and  85 respectively. Examination: [place]. Comment: The patient was injected with 10.6 mCi of technetium 99 sestamibi. Following the Lexiscan infusion she was reinjected with 31.7 mCi of technetium 99 sestamibi. Repeat images were obtained following standard protocol revealing the followin. Analysis of stress and rest images reveals no evidence of ischemia or infarction. 2. Analysis of gated images reveals that the left ventricular ejection fraction is 71% at rest.    1. Negative Lexiscan for the presence of ischemia or infarction. 2. The left ventricular ejection fraction is 71%. Signed by Dr Micah Patton on 2019 11:12 AM        Hospital Course: As per Initial admission HPI 2019, quoted below;  \"\"80year old white female sent to the emergency department from cardiology clinic with complaint of pain across her chest since fall 2 weeks ago. Pain is a steady ache, occasionally sharp with certain movements or with deep inspiration. She has a history of COPD. Has seen her primary care provider and was placed on Xarelto recently for uncertain diagnosis. Short of breath, states that her home inhalers do not work and she only gets relief from breathing treatments with her home nebulizer. Chest pain is reproduced on palpation. She stopped smoking yesterday. \"       # Chest pain   - Initial troponin negative X 4 sets  - Serial EKGs for chest pain  - Optimized medical management  -->  Aspirin, Statin and Beta Blocker. --> Nitro glycerin sublingual when necessary for chest pain  - Continue to monitor on telemetry  -Followed by cardiology  -Chest pain noncardiac etiology  Suspect musculoskeletal etiology, given pain is reproducible and patient reports recent fall.  - NM  Pharm Stress test for better risk stratification - 06/26/2019: Reports a \"Negative Lexiscan for the presence of ischemia or infarction. The left ventricular ejection fraction is 71%. \"  Okay for discharge from cardiology standpoint    # History of COPD, not in  exacerbation  - Continued nebulized Bronchodilators scheduled and on as-needed basis. - Systemic Steroids    --> methylprednisolone (Solu-Medrol) 125 mg IV ×1 dose Initiated in the ED  --> Methylprednisolone (Solu-Medrol) 40 mg IV Daily  -Patient to be discharged on prednisone taper      Physical Exam:  Vital Signs: BP (!) 112/58   Pulse 69   Temp 97.2 °F (36.2 °C) (Temporal)   Resp 20   Ht 5' 5\" (1.651 m)   Wt 134 lb 9.6 oz (61.1 kg)   SpO2 98%   BMI 22.40 kg/m²   Physical Exam  General appearance: alert, appears stated age and cooperative  HEENT: Head: Normocephalic, no lesions, without obvious abnormality. Neck: no carotid bruit, no JVD and supple, symmetrical, trachea midline  Lungs: Patient in no acute respiratory distress, No increased work of breathing clear to auscultation bilaterally  Heart: regular rate and rhythm, S1, S2 normal, no murmur, click, rub or gallop  Abdomen: soft, non-tender; bowel sounds normal; no masses,  no organomegaly  Extremities: extremities normal, atraumatic, no cyanosis or edema   2+ pulses in bilateral lower extremities  Neurologic: Mental status: Alert, oriented, thought content appropriate  Skin: Skin Warm, dry with , color, texture, turgor normal. No rashes or lesions or nodules.      Discharge Medications:       [facility]   Home Medication Instructions [facility]:[id number]    Printed on:06/27/19 5075   Medication Information                      albuterol sulfate  (90 Base) MCG/ACT inhaler  Inhale 2 puffs into the lungs 4 times daily              aspirin 325 MG EC tablet  Take 325 mg by mouth daily. atorvastatin (LIPITOR) 40 MG tablet  Take 40 mg by mouth daily. cholestyramine (QUESTRAN) 4 g packet  Take 1 packet by mouth 2 times daily             Cream Base CREA  [facility] Pain Cream- Lidocaine only- Multiallergies-Apply 1-2 pumps to affected area 3-4 times a day. [id number]. dicyclomine (BENTYL) 10 MG capsule  Take 10 mg by mouth 4 times daily as needed Indications: Diarrhea             doxycycline hyclate (VIBRA-TABS) 100 MG tablet  Take 100 mg by mouth 2 times daily For ten days filled on 6/24/19             famotidine (PEPCID) 20 MG tablet  Take 20 mg by mouth daily             ferrous sulfate 325 (65 Fe) MG tablet  Take 325 mg by mouth daily (with breakfast)             fluticasone (VERAMYST) 27.5 MCG/SPRAY nasal spray  1 spray by Nasal route daily              HYDROcodone-acetaminophen (NORCO)  MG per tablet  Take 1 tablet by mouth every 6 hours as needed for Pain.  Take half to one tablet every 4-6 hrs as needed             ipratropium-albuterol (DUONEB) 0.5-2.5 (3) MG/3ML SOLN nebulizer solution  Inhale 1 vial into the lungs 4 times daily             metoprolol succinate (TOPROL XL) 25 MG extended release tablet  Take 0.5 tablets by mouth daily             mometasone (ASMANEX) 220 MCG/INH inhaler  Inhale 2 puffs into the lungs 2 times daily              Multiple Vitamins-Minerals (PRESERVISION AREDS 2 PO)  Take by mouth 2 times daily             nicotine (NICODERM CQ) 21 MG/24HR  Place 1 patch onto the skin daily             omeprazole (PRILOSEC) 20 MG delayed release capsule  Take 20 mg by mouth 2 times daily             Oxygen Concentrator  Indications: uses 8-10 hrs nightly potassium chloride (KLOR-CON) 10 MEQ CR tablet  10 mEq 2 times daily Take 2 tablets once daily             predniSONE (DELTASONE) 10 MG tablet  Take 1 tablet by mouth daily for 3 days             predniSONE (DELTASONE) 20 MG tablet  Take 1 tablet by mouth daily for 3 days             predniSONE (DELTASONE) 5 MG tablet  Take 3 tablets by mouth daily for 3 days             predniSONE (DELTASONE) 5 MG tablet  Take 1 tablet by mouth daily for 3 days             rivaroxaban (XARELTO) 20 MG TABS tablet  Take 20 mg by mouth daily (with breakfast)             salmeterol (SEREVENT DISKUS) 50 MCG/DOSE diskus inhaler  Inhale 1 puff into the lungs 2 times daily              tolterodine (DETROL) 2 MG tablet  Take 2 mg by mouth 2 times daily             traZODone (DESYREL) 50 MG tablet  Take 50 mg by mouth nightly                 Discharge Instructions: Follow up with GALEN Bradley CNP in 7 days. Take medications as directed. Resume activity as tolerated. Diet: DIET CARDIAC;        DISCHARGE STATUS:    Condition: Good  Disposition: Patient is medically stable and will be discharged home    Extended Emergency Contact Information  Primary Emergency Contact: [address] [place] [address] Phone: [phone]  Mobile Phone: [phone]  Relation: Child       Time Spent on discharge is more than 33 in the examination, evaluation, counseling and review of medications and discharge plan. Electronically signed by   Opal Tinoco MD, MPH,   Internal Medicine Hospitalist   6/27/2019 3:39 PM      Thank you GALEN Bradley CNP for the opportunity to be involved in this patient's care. If you have any questions or concerns please feel free to contact me at [phone]        EMR Dragon/Transcription disclaimer:   Much of this encounter note is an electronic transcription/translation of spoken language to printed text.  The electronic translation of spoken language may permit erroneous,

## 2019-06-27 NOTE — PROGRESS NOTES
Palliative Care: Met with to initiate Palliative Care. Pt says she was having chest pain and she had a fall. Past Medical History:        Past Medical History:   Diagnosis Date    Abnormal EKG 11/14/2017    Angina     Arthritis     Asthma     Atherosclerosis     Brain aneurysm     with a coil    Bulging disc     Cellulitis of right groin 12/11/2017    Chest pain     COPD (chronic obstructive pulmonary disease) (HCC)     Diverticulitis     GERD (gastroesophageal reflux disease)     has colostomy bag    Hiatal hernia     Irregular heartbeat     Irritable bowel syndrome     Mild CAD 12/11/2017    Mixed hyperlipidemia 11/14/2017    Osteoporosis     Palliative care patient 06/27/2019    Palpitations 1/3/2018    Rheumatoid arthritis(714.0)     Scoliosis     Shingles     Spinal stenosis     Spondylisthesis     L5-6    Tobacco abuse 11/14/2017    She has smoked since she was 12years old, continues to smoke.  Ulcer     Uterine cancer [facility])        Advance Directives:   Pt has a Living Will Directive scanned to [facility] Pain/Other Symptoms:    Pt says she is having chest pain, says she take pain medications at home. Activity:         Pt to return to activity as tolerated. Psychological/Spiritual:         Pt says she is [place].    Patient/Family Discussion:      Pt says she has six daughters, grandchildren, great-grandchildren, and great-great-grandchildren. Plan:    Plans to return home. Patients goal, what they hope for:    Goal is to live at home alone. Provided spiritual care with sustaining presence, nurtured hope, and prayer. Pt expressed gratitude for spiritual care.         Electronically signed by Lisa Marino on 6/27/2019 at 11:37 AM

## 2019-06-27 NOTE — PROGRESS NOTES
Ostomy Care  Referral re: established ostomy. Patient sitting on side of bed eating lunch. Colosotmy to LLQ. Stated she has had since Feb. 2012, at [facility] for diverticulitis. Colostomy appliance intact, pouch is clean. Patient stated she has not had BM in 2 days. Stated she usually has to take a Doculax when this happens. Informed Lupe RN, re: patient has not had BM in 2 days and usually takes Doculax for this, she will follow up with patient. Supplies to change appliance taken to patient room, patient stated she did not bring supplies with her, takes care of per self.

## 2019-06-27 NOTE — PROGRESS NOTES
Cardiology:    The patient has a negative Lexiscan for the presence of ischemia or infarction. The left ventricular ejection fraction was calculated to be 71%. I have also reviewed the echocardiogram which is unremarkable as well. There is no pericardial effusion. With the above findings in mind it is felt that she may be discharged home from a cardiac standpoint. Although the patient's pain level has improved, she states that it is still significant. As I noted previously, these discomforts are almost certainly noncardiac as they are extremely atypical for angina.

## 2019-06-27 NOTE — PROGRESS NOTES
[id number] [facility]  Progress Note                            Date:  6/27/2019  Patient: Kurt Malloy  Admission:  6/26/2019  9:43 AM  Admit DX: COPD (chronic obstructive pulmonary disease) ([facility]) [J44.9]  COPD (chronic obstructive pulmonary disease) ([address] [J44.9]  Age:  80 y. o., 12/25/1933     LOS: 0 days     Reason for evaluation:   Atypical chest pain      SUBJECTIVE:    The patient was seen and examined. Notes and labs reviewed. There were no complications over night. Patient's cardiac review of systems: positive for chest pain. The patient is generally feeling better. Pleuritic, positional, atypical chest pain has improved since admission. OBJECTIVE:    Telemetry: Sinus rhythm  BP (!) 91/56   Pulse 70   Temp 97 °F (36.1 °C) (Temporal)   Resp 18   Ht 5' 5\" (1.651 m)   Wt 134 lb 9.6 oz (61.1 kg)   SpO2 96%   BMI 22.40 kg/m²     Intake/Output Summary (Last 24 hours) at 6/27/2019 4193  Last data filed at 6/27/2019 0520  Gross per 24 hour   Intake 360 ml   Output 400 ml   Net -40 ml       Labs:   CBC:   Recent Labs     06/26/19  1000 06/27/19  0156   WBC 9.4 11.4*   HGB 12.0 11.1*   HCT 37.2 33.7*    203     BMP:   Recent Labs     06/26/19  1000 06/26/19  1038 06/27/19  0156     --  137   K 4.5 3.8 4.3   CO2 29  --  22   BUN 19  --  24*   CREATININE 0.8  --  0.8   LABGLOM >60  --  >60   GLUCOSE 83  --  139*     BNP: No results for input(s): BNP in the last 72 hours.   PT/INR:   Recent Labs     06/26/19  1000   PROTIME 27.8*   INR 2.68*     APTT:  Recent Labs     06/26/19  1000   APTT 42.3*     CARDIAC ENZYMES:  Recent Labs     06/26/19  1237 06/26/19  1725 06/27/19  0156   TROPONINI <0.01 <0.01 <0.01     FASTING LIPID PANEL:No results found for: HDL, LDLDIRECT, LDLCALC, TRIG  LIVER PROFILE:  Recent Labs     06/26/19  1000   AST 13   ALT 10   LABALBU 4.1           PFSH:  No change    ROSS:  No change      Physical Examination:  BP (!) 91/56   Pulse 70   Temp 97

## 2019-06-28 LAB
EKG P AXIS: 80 DEGREES
EKG P-R INTERVAL: 196 MS
EKG Q-T INTERVAL: 414 MS
EKG QRS DURATION: 92 MS
EKG QTC CALCULATION (BAZETT): 417 MS
EKG T AXIS: 69 DEGREES
LV EF: 71 %
LVEF MODALITY: NORMAL

## 2019-07-22 PROBLEM — R91.1 LUNG NODULE: Status: ACTIVE | Noted: 2019-01-01

## 2019-07-22 PROBLEM — J43.2 CENTRILOBULAR EMPHYSEMA (HCC): Status: ACTIVE | Noted: 2019-01-01

## 2019-07-22 NOTE — PATIENT INSTRUCTIONS
I did advise the patient and her daughter that by Fleischner guidelines we will get a repeat CT in about 2 months which would be about 3 months from her previous CT showing a lung nodule.  Depending on what that CT shows will determine need for further work-up possibly just serial imaging studies versus possible empiric radiation therapy if the lesion increased in size.  I do not think she would be a good candidate for invasive work-up particularly surgery and the patient and her daughter agree.  She is having some difficulty in getting her servant dosage down into her lungs I told her she might be a candidate for the Stiolto Respimat in the future.

## 2019-07-23 NOTE — PROGRESS NOTES
Subjective   Shirley Bamforth is a 85 y.o. female.     Chief Complaint   Patient presents with   • COPD      No My Sticky Note on file.    History of Present Illness   The patient had some problems recently with increasing dyspnea and also some chest pain.  She had a chest CT done at [facility] which did show some nodules and in particular there was a 1.3 cm x 1.2 cm x 0.9 cm right lower lobe nodule suspicious for a possible malignant process.  She had some smaller nodules 1 of which was calcified but this was the main nodule of concern.  Recommendations per radiology at [facility] work per Fleischner guidelines with a PET scan versus follow-up CT in 3 months.  The patient does have significant COPD and has several other medical comorbidities and would be a poor candidate for any invasive work-up.  The only actually have the scan for review to determine how peripheral it might be located in terms of any nonsurgical approach to the lesion.  I did tell her that I would recommend a repeat CT in about 2 months which will be about a month from her CT at [facility] and then will proceed with further work-up as indicated.  If it did increase in size think an option might be just empiric radiation therapy.  She did have some cardiac work-up subsequent to that work-up which was done at [facility] because of her chest pain and apparently was not felt that she had any definite ischemic heart disease as the etiology for her pain.  She is accompanied by her daughter today.  She does have a history of gastroesophageal reflux disease and that could be contributing factor to her pain.  She does state when she has more problems with dyspnea she has a difficult time utilizing her dry powder inhaler particular her Serevent Diskus.  She might be candidate for something such as Stiolto in the future but I told her the concern would be whether not it is covered under her insurance.  Again she does have significant COPD  and a dry powder inhaler may not be adequate to deliver significant bronchodilators and to her lower airways.    Medical/Family/Social History   has a past medical history of Asthma, Bilateral foot pain, Brain aneurysm, Cancer, uterine (CMS/HCC), COPD (chronic obstructive pulmonary disease) (CMS/HCC), Diverticulitis, Dyspnea, GERD (gastroesophageal reflux disease), Ingrown toenail, Osteoporosis, Rheumatoid arthritis (CMS/HCC), and Shingles (herpes zoster) polyneuropathy.   has a past surgical history that includes Hysterectomy; Appendectomy; Cataract extraction, extracapsular w/ intraocular lens implant (Bilateral); Hernia repair; Cerebral aneurysm repair; Colostomy; Tonsillectomy and adenoidectomy; and Shoulder surgery (Right).  family history includes Arthritis in her mother; Cancer in her brother, daughter, and mother; Diabetes in her brother and mother; Heart disease in her brother; Hypertension in her brother and mother.   reports that she has quit smoking. Her smoking use included cigarettes. She has a 60.00 pack-year smoking history. She has never used smokeless tobacco. She reports that she does not drink alcohol or use drugs.  Allergies   Allergen Reactions   • Aleve  [Naproxen Sodium] Unknown (See Comments)   • Aspirin Unknown (See Comments)   • Azithromycin Unknown (See Comments)   • Ibuprofen Swelling   • Naproxen Unknown (See Comments)   • Neomycin Itching   • Neosporin  [Neomycin-Bacitracin Zn-Polymyx] Unknown (See Comments)   • Oxycontin [Oxycodone Hcl] Unknown (See Comments)   • Penicillin G Unknown (See Comments)   • Penicillins    • Piroxicam Itching   • Polymyxin B Itching   • Sodium Hypochlorite Unknown (See Comments)   • Sulfa Antibiotics    • Tramadol Itching   • Adhesive Tape Rash   • Baclofen Rash and Confusion   • Nsaids Rash and Unknown (See Comments)     Motrin,naprosyn   • Soma [Carisoprodol] Rash and Unknown (See Comments)     Medications    Current Outpatient Medications:   •  albuterol  (PROVENTIL HFA;VENTOLIN HFA) 108 (90 BASE) MCG/ACT inhaler, Every 6 (Six) Hours., Disp: , Rfl:   •  aspirin 81 MG EC tablet, Take 81 mg by mouth Daily., Disp: , Rfl:   •  atorvastatin (LIPITOR) 40 MG tablet, Take 40 mg by mouth daily.  , Disp: , Rfl:   •  famotidine (PEPCID) 20 MG tablet, Take 20 mg by mouth., Disp: , Rfl:   •  fluticasone (FLONASE) 50 MCG/ACT nasal spray, fluticasone propionate 50 mcg/act susp  2 sprays each nostril daily, Disp: , Rfl:   •  ipratropium-albuterol (DUO-NEB) 0.5-2.5 mg/3 ml nebulizer, Every 6 (Six) Hours., Disp: , Rfl:   •  metoprolol succinate XL (TOPROL XL) 25 MG 24 hr tablet, Take 12.5 mg by mouth., Disp: , Rfl:   •  Mometasone Furoate (ASMANEX HFA) 200 MCG/ACT aerosol, Inhale 200 mcg 2 (Two) Times a Day., Disp: 1 inhaler, Rfl: 0  •  Multiple Vitamins-Minerals (PRESERVISION AREDS 2+MULTI VIT PO), , Disp: , Rfl:   •  nicotine (NICODERM CQ) 21 MG/24HR patch, Place 1 patch on the skin as directed by provider., Disp: , Rfl:   •  O2 (OXYGEN), Indications: uses 8-10 hrs nightly, Disp: , Rfl:   •  omeprazole (priLOSEC) 20 MG capsule, Daily., Disp: , Rfl:   •  potassium chloride (K-DUR) 10 MEQ CR tablet, Take 10 mEq by mouth 2 (Two) Times a Day., Disp: , Rfl:   •  rivaroxaban (XARELTO) 20 MG tablet, Take 20 mg by mouth., Disp: , Rfl:   •  salmeterol (SEREVENT DISKUS) 50 MCG/DOSE diskus inhaler, Inhale 1 puff into the lungs 2 times daily.  , Disp: , Rfl:   •  tolterodine (DETROL) 2 MG tablet, Take 2 mg by mouth., Disp: , Rfl:   •  traZODone (DESYREL) 50 MG tablet, Take 50 mg by mouth., Disp: , Rfl:   •  indapamide (LOZOL) 2.5 MG tablet, Daily., Disp: , Rfl:     Review of Systems   Constitutional: Positive for fatigue. Negative for chills and fever.   HENT: Negative for congestion.    Eyes: Negative for visual disturbance.   Respiratory: Positive for shortness of breath. Negative for cough.    Cardiovascular: Positive for chest pain.   Gastrointestinal: Positive for GERD and indigestion.  "Negative for diarrhea, nausea and vomiting.   Genitourinary: Negative for difficulty urinating.   Musculoskeletal: Negative for arthralgias.   Skin: Negative for rash.   Neurological: Negative for dizziness and speech difficulty.   Hematological: Negative for adenopathy.   Psychiatric/Behavioral: The patient is not nervous/anxious.      ------------------------------------  Objective   /74   Pulse 74   Ht 165.1 cm (65\")   Wt 62.1 kg (137 lb)   SpO2 96% Comment: RA  Breastfeeding? No   BMI 22.80 kg/m²   Physical Exam   Constitutional: She is oriented to person, place, and time. She appears well-developed and well-nourished.   She is a chronically ill-appearing white female.   HENT:   Head: Normocephalic and atraumatic.   Eyes: EOM are normal. Pupils are equal, round, and reactive to light.   Neck: Normal range of motion. Neck supple.   Cardiovascular: Normal rate, regular rhythm and normal heart sounds.   Pulmonary/Chest: Effort normal.   Breath sounds are diminished.   Abdominal: Soft.   Musculoskeletal: Normal range of motion.   Neurological: She is alert and oriented to person, place, and time.   Skin: Skin is warm and dry.   Psychiatric: She has a normal mood and affect.   Nursing note and vitals reviewed.          Pulmonary Functions Testing Results:  FEV1   Date Value Ref Range Status   03/14/2019 74% liters Final     FVC   Date Value Ref Range Status   03/14/2019 98% liters Final     FEV1/FVC   Date Value Ref Range Status   03/14/2019 56.12 % Final     DLCO   Date Value Ref Range Status   03/14/2019 58% ml/mmHg sec Final        Assessment/Plan   Marii was seen today for copd.    Diagnoses and all orders for this visit:    Lung nodule  -     CT Chest Without Contrast; Future    Centrilobular emphysema (CMS/HCC)    Stage 2 moderate COPD by GOLD classification (CMS/HCC)    Gastroesophageal reflux disease, esophagitis presence not specified      Patient's Body mass index is 22.8 kg/m². BMI is within " normal parameters. No follow-up required..  She already has an appoint with me in about 2 months and we will plan on getting a repeat chest CT without contrast just prior to return visit and then proceed with further work-up as indicated.  Again I think she will be a poor candidate for any invasive work-up.  We could consider trying her on the Stiolto Respimat but she would have to switch her DuoNeb's to plain albuterol if we were going to do this.  The other option will be the Striverdi Respimat in place of her Serevent but this is likely not to be well covered on insurance.

## 2019-08-20 ENCOUNTER — OFFICE VISIT (OUTPATIENT)
Dept: NEUROSURGERY | Age: 84
End: 2019-08-20
Payer: MEDICARE

## 2019-08-20 VITALS
WEIGHT: 140 LBS | BODY MASS INDEX: 23.32 KG/M2 | HEART RATE: 60 BPM | OXYGEN SATURATION: 98 % | DIASTOLIC BLOOD PRESSURE: 62 MMHG | HEIGHT: 65 IN | SYSTOLIC BLOOD PRESSURE: 108 MMHG

## 2019-08-20 DIAGNOSIS — G89.29 CHRONIC LOW BACK PAIN WITH BILATERAL SCIATICA, UNSPECIFIED BACK PAIN LATERALITY: Primary | ICD-10-CM

## 2019-08-20 DIAGNOSIS — M54.2 NECK PAIN: ICD-10-CM

## 2019-08-20 DIAGNOSIS — M54.41 CHRONIC LOW BACK PAIN WITH BILATERAL SCIATICA, UNSPECIFIED BACK PAIN LATERALITY: Primary | ICD-10-CM

## 2019-08-20 DIAGNOSIS — R20.0 NUMBNESS AND TINGLING OF BOTH LEGS: ICD-10-CM

## 2019-08-20 DIAGNOSIS — R20.2 NUMBNESS AND TINGLING OF BOTH LEGS: ICD-10-CM

## 2019-08-20 DIAGNOSIS — M54.42 CHRONIC LOW BACK PAIN WITH BILATERAL SCIATICA, UNSPECIFIED BACK PAIN LATERALITY: Primary | ICD-10-CM

## 2019-08-20 PROCEDURE — 1123F ACP DISCUSS/DSCN MKR DOCD: CPT | Performed by: PSYCHIATRY & NEUROLOGY

## 2019-08-20 PROCEDURE — G8598 ASA/ANTIPLAT THER USED: HCPCS | Performed by: PSYCHIATRY & NEUROLOGY

## 2019-08-20 PROCEDURE — G8400 PT W/DXA NO RESULTS DOC: HCPCS | Performed by: PSYCHIATRY & NEUROLOGY

## 2019-08-20 PROCEDURE — 1090F PRES/ABSN URINE INCON ASSESS: CPT | Performed by: PSYCHIATRY & NEUROLOGY

## 2019-08-20 PROCEDURE — G8427 DOCREV CUR MEDS BY ELIG CLIN: HCPCS | Performed by: PSYCHIATRY & NEUROLOGY

## 2019-08-20 PROCEDURE — 1036F TOBACCO NON-USER: CPT | Performed by: PSYCHIATRY & NEUROLOGY

## 2019-08-20 PROCEDURE — 99214 OFFICE O/P EST MOD 30 MIN: CPT | Performed by: PSYCHIATRY & NEUROLOGY

## 2019-08-20 PROCEDURE — G8420 CALC BMI NORM PARAMETERS: HCPCS | Performed by: PSYCHIATRY & NEUROLOGY

## 2019-08-20 PROCEDURE — 4040F PNEUMOC VAC/ADMIN/RCVD: CPT | Performed by: PSYCHIATRY & NEUROLOGY

## 2019-08-20 RX ORDER — METOPROLOL SUCCINATE 25 MG/1
1 TABLET, EXTENDED RELEASE ORAL DAILY
COMMUNITY

## 2019-08-20 NOTE — PROGRESS NOTES
09/13/2018    Chronic left shoulder pain 09/13/2018    Leg edema, left 06/26/2019    Palliative care patient 06/27/2019     Resolved Ambulatory Problems     Diagnosis Date Noted    No Resolved Ambulatory Problems     Past Medical History:   Diagnosis Date    Angina     Arthritis     Asthma     Atherosclerosis     Bulging disc     Chest pain     COPD (chronic obstructive pulmonary disease) (HCC)     Irritable bowel syndrome     Osteoporosis     Rheumatoid arthritis(714.0)     Scoliosis     Spinal stenosis     Spondylisthesis     Ulcer     Uterine cancer (HCC)        Past Surgical History:   Procedure Laterality Date    ABDOMINAL ADHESION SURGERY      APPENDECTOMY      CARDIAC CATHETERIZATION  12/06/2017    EF > 60%     [facility]    CHOLECYSTECTOMY      COLON SURGERY      colostomy    ELBOW SURGERY      RIGHT    HAND SURGERY      RIGHT    HERNIA REPAIR      HYSTERECTOMY      SHOULDER SURGERY      RIGHT    SKIN CANCER EXCISION  1962    TONSILLECTOMY AND ADENOIDECTOMY      TUBAL LIGATION      TYMPANOSTOMY TUBE PLACEMENT      LEFT       Family History   Problem Relation Age of Onset    Diabetes Mother     Heart Disease Mother     Cancer Mother     Other Mother     Heart Disease Brother         2    Stroke Brother     Diabetes Brother     Cancer Brother        Social History     Socioeconomic History    Marital status: Single     Spouse name: Not on file    Number of children: Not on file    Years of education: Not on file    Highest education level: Not on file   Occupational History    Not on file   Social Needs    Financial resource strain: Not on file    Food insecurity:     Worry: Not on file     Inability: Not on file    Transportation needs:     Medical: Not on file     Non-medical: Not on file   Tobacco Use    Smoking status: Former Smoker     Packs/day: 1.50     Years: 70.00     Pack years: 105.00     Types: Cigarettes     Start date: 1949     Last attempt to quit:

## 2019-09-13 PROBLEM — R63.6 UNDERWEIGHT: Status: ACTIVE | Noted: 2019-01-01

## 2019-09-13 NOTE — PATIENT INSTRUCTIONS
I did review the patient's follow-up chest CT and I do not have the official report as of yet.  However the nodule in question may have shown some increase in size compared to scans done at [facility] previously.  She wondered about some form of oral chemotherapy.  I told her that that would not be feasible as a tissue diagnosis were established and I do not think she is a candidate for any invasive work-up of the small nodule.  An option might be empiric radiation therapy.  As she is asymptomatic at present I told her it be fine just to hold off on any work-up and just get a repeat CT in 6 months and that will be the plan.  I will call with the official results when available.  We will get a flow volume loop on return.

## 2019-09-13 NOTE — PROGRESS NOTES
Subjective   Shirley A Bamforth is a 85 y.o. female.     Chief Complaint   Patient presents with   • Follow up lung nodule      No My Sticky Note on file.    History of Present Illness   The patient returns today or results of her follow-up CT.  The official report is not yet available.  The nodular density which is adjacent to a cystic lesion in the right lower lobe is still present.  It may have shown some slight increase in size.  She wondered about some form of oral chemotherapy and I told her that would not be feasible without a tissue diagnosis and this lesion be very difficult to access for diagnostic purposes with significant risk of pneumothorax.  I told her if she is going to consider empiric therapy with radiation that would be an option.  Present over the lesion appears to be causing no symptoms I think it could be just followed with serial imaging studies and she and her daughter are in agreement with this.  She has been able to stay off cigarettes for the past few months and is wearing the nicotine patch.    Medical/Family/Social History   has a past medical history of Asthma, Bilateral foot pain, Brain aneurysm, Cancer, uterine (CMS/HCC), COPD (chronic obstructive pulmonary disease) (CMS/HCC), Diverticulitis, Dyspnea, GERD (gastroesophageal reflux disease), Ingrown toenail, Osteoporosis, Rheumatoid arthritis (CMS/HCC), and Shingles (herpes zoster) polyneuropathy.   has a past surgical history that includes Hysterectomy; Appendectomy; Cataract extraction, extracapsular w/ intraocular lens implant (Bilateral); Hernia repair; Cerebral aneurysm repair; Colostomy; Tonsillectomy and adenoidectomy; and Shoulder surgery (Right).  family history includes Arthritis in her mother; Cancer in her brother, daughter, and mother; Diabetes in her brother and mother; Heart disease in her brother; Hypertension in her brother and mother.   reports that she has quit smoking. Her smoking use included cigarettes. She has a  60.00 pack-year smoking history. She has never used smokeless tobacco. She reports that she does not drink alcohol or use drugs.  Allergies   Allergen Reactions   • Aleve  [Naproxen Sodium] Unknown (See Comments)   • Aspirin Unknown (See Comments)   • Azithromycin Unknown (See Comments)   • Ibuprofen Swelling   • Naproxen Unknown (See Comments)   • Neomycin Itching   • Neosporin  [Neomycin-Bacitracin Zn-Polymyx] Unknown (See Comments)   • Oxycontin [Oxycodone Hcl] Unknown (See Comments)   • Penicillin G Unknown (See Comments)   • Penicillins    • Piroxicam Itching   • Polymyxin B Itching   • Sodium Hypochlorite Unknown (See Comments)   • Sulfa Antibiotics    • Tramadol Itching   • Adhesive Tape Rash   • Baclofen Rash and Confusion   • Nsaids Rash and Unknown (See Comments)     Motrin,naprosyn   • Soma [Carisoprodol] Rash and Unknown (See Comments)     Medications    Current Outpatient Medications:   •  albuterol (PROVENTIL HFA;VENTOLIN HFA) 108 (90 BASE) MCG/ACT inhaler, Every 6 (Six) Hours., Disp: , Rfl:   •  aspirin 81 MG EC tablet, Take 81 mg by mouth Daily., Disp: , Rfl:   •  atorvastatin (LIPITOR) 40 MG tablet, Take 40 mg by mouth daily.  , Disp: , Rfl:   •  azithromycin (ZITHROMAX) 250 MG tablet, TAKE 2 TABLETS BY MOUTH THE FIRST DAY THEN TAKE 1 TABLET BY MOUTH ONCE DAILY FOR 4 DAYS, Disp: , Rfl: 0  •  famotidine (PEPCID) 20 MG tablet, Take 20 mg by mouth., Disp: , Rfl:   •  fluticasone (FLONASE) 50 MCG/ACT nasal spray, fluticasone propionate 50 mcg/act susp  2 sprays each nostril daily, Disp: , Rfl:   •  HM NICOTINE 14 MG/24HR patch, APPLY 1 PATCH TRANSDERMALLY DAILY, Disp: , Rfl: 3  •  indapamide (LOZOL) 2.5 MG tablet, Daily., Disp: , Rfl:   •  ipratropium-albuterol (DUO-NEB) 0.5-2.5 mg/3 ml nebulizer, Every 6 (Six) Hours., Disp: , Rfl:   •  methylPREDNISolone (MEDROL, LU,) 4 MG tablet, TAKE AS DIRECTED WITH FOOD FOR 6 DAYS, Disp: , Rfl: 0  •  metoprolol succinate XL (TOPROL XL) 25 MG 24 hr tablet, Take  "12.5 mg by mouth., Disp: , Rfl:   •  Mometasone Furoate (ASMANEX HFA) 200 MCG/ACT aerosol, Inhale 200 mcg 2 (Two) Times a Day., Disp: 1 inhaler, Rfl: 0  •  Multiple Vitamins-Minerals (PRESERVISION AREDS 2+MULTI VIT PO), , Disp: , Rfl:   •  nicotine (NICODERM CQ) 21 MG/24HR patch, Place 1 patch on the skin as directed by provider., Disp: , Rfl:   •  O2 (OXYGEN), Indications: uses 8-10 hrs nightly, Disp: , Rfl:   •  omeprazole (priLOSEC) 20 MG capsule, Daily., Disp: , Rfl:   •  potassium chloride (K-DUR) 10 MEQ CR tablet, Take 10 mEq by mouth 2 (Two) Times a Day., Disp: , Rfl:   •  rivaroxaban (XARELTO) 20 MG tablet, Take 20 mg by mouth., Disp: , Rfl:   •  salmeterol (SEREVENT DISKUS) 50 MCG/DOSE diskus inhaler, Inhale 1 puff into the lungs 2 times daily.  , Disp: , Rfl:   •  tolterodine (DETROL) 2 MG tablet, Take 2 mg by mouth., Disp: , Rfl:   •  traZODone (DESYREL) 50 MG tablet, Take 50 mg by mouth., Disp: , Rfl:     Review of Systems   Constitutional: Positive for fatigue. Negative for chills and fever.   HENT: Negative for congestion.    Eyes: Negative for visual disturbance.   Respiratory: Positive for shortness of breath. Negative for cough.    Cardiovascular: Positive for chest pain.   Gastrointestinal: Positive for GERD and indigestion. Negative for diarrhea, nausea and vomiting.   Genitourinary: Negative for difficulty urinating.   Musculoskeletal: Positive for gait problem. Negative for arthralgias.   Skin: Negative for rash.   Neurological: Negative for dizziness and speech difficulty.   Psychiatric/Behavioral: The patient is not nervous/anxious.      ------------------------------------  Objective   /60   Pulse 81   Ht 165.1 cm (65\")   Wt 59.9 kg (132 lb)   SpO2 97% Comment: RA  Breastfeeding? No   BMI 21.97 kg/m²   Physical Exam  Constitutional: She is oriented to person, place, and time. She appears well-developed and well-nourished.   She is a chronically ill-appearing white female.   She " ambulates with a cane.  HENT:   Head: Normocephalic and atraumatic.   Eyes: EOM are normal. Pupils are equal, round, and reactive to light.   Neck: Normal range of motion. Neck supple.   Cardiovascular: Normal rate, regular rhythm and normal heart sounds.   Pulmonary/Chest: Effort normal.   Breath sounds are diminished.   Abdominal: Soft.   Musculoskeletal: Normal range of motion.   Neurological: She is alert and oriented to person, place, and time.   Skin: Skin is warm and dry.  A nicotine patch is present on the right upper arm.  Psychiatric: She has a normal mood and affect.   Nursing note and vitals reviewed.    Pulmonary Functions Testing Results:  FEV1   Date Value Ref Range Status   03/14/2019 74% liters Final     FVC   Date Value Ref Range Status   03/14/2019 98% liters Final     FEV1/FVC   Date Value Ref Range Status   03/14/2019 56.12 % Final     DLCO   Date Value Ref Range Status   03/14/2019 58% ml/mmHg sec Final        Assessment/Plan   Marii was seen today for follow up lung nodule.    Diagnoses and all orders for this visit:    Lung nodule  -     CT Chest Without Contrast; Future    Centrilobular emphysema (CMS/HCC)    Stage 2 moderate COPD by GOLD classification (CMS/[facility])    Personal history of nicotine dependence    Underweight      Patient's Body mass index is 21.97 kg/m². BMI is above normal parameters. Recommendations include: referral to primary care.    I will call her with her CT results when available.  Otherwise we will see her back in 6 months with a repeat chest CT just prior to return.

## 2019-09-17 NOTE — TELEPHONE ENCOUNTER
Please check with [facility] and ask them why I do not have the results of this study yet.  I cannot access it on epic and they have not sent me the results either.

## 2019-09-17 NOTE — TELEPHONE ENCOUNTER
Yovany to Christine in radiology and the 09/17/19 CT Scan is attached to the 07/22/19 order under imaging in the patient's chart.

## 2019-10-04 NOTE — TELEPHONE ENCOUNTER
Patient brought disc in from [facility], exam dates 6/17/19 and 6/24/19.  Disc given to Dr. Whiting today for review.

## 2019-10-10 ENCOUNTER — OFFICE VISIT (OUTPATIENT)
Dept: CARDIOLOGY | Age: 84
End: 2019-10-10
Payer: MEDICARE

## 2019-10-10 VITALS
WEIGHT: 139 LBS | HEIGHT: 65 IN | SYSTOLIC BLOOD PRESSURE: 120 MMHG | DIASTOLIC BLOOD PRESSURE: 70 MMHG | BODY MASS INDEX: 23.16 KG/M2 | HEART RATE: 54 BPM

## 2019-10-10 DIAGNOSIS — E78.2 MIXED HYPERLIPIDEMIA: ICD-10-CM

## 2019-10-10 DIAGNOSIS — R00.2 PALPITATIONS: Primary | ICD-10-CM

## 2019-10-10 DIAGNOSIS — I25.10 CORONARY ARTERY DISEASE INVOLVING NATIVE CORONARY ARTERY OF NATIVE HEART WITHOUT ANGINA PECTORIS: ICD-10-CM

## 2019-10-10 PROCEDURE — G8400 PT W/DXA NO RESULTS DOC: HCPCS | Performed by: CLINICAL NURSE SPECIALIST

## 2019-10-10 PROCEDURE — 99213 OFFICE O/P EST LOW 20 MIN: CPT | Performed by: CLINICAL NURSE SPECIALIST

## 2019-10-10 PROCEDURE — 4040F PNEUMOC VAC/ADMIN/RCVD: CPT | Performed by: CLINICAL NURSE SPECIALIST

## 2019-10-10 PROCEDURE — G8598 ASA/ANTIPLAT THER USED: HCPCS | Performed by: CLINICAL NURSE SPECIALIST

## 2019-10-10 PROCEDURE — 1036F TOBACCO NON-USER: CPT | Performed by: CLINICAL NURSE SPECIALIST

## 2019-10-10 PROCEDURE — G8420 CALC BMI NORM PARAMETERS: HCPCS | Performed by: CLINICAL NURSE SPECIALIST

## 2019-10-10 PROCEDURE — G8484 FLU IMMUNIZE NO ADMIN: HCPCS | Performed by: CLINICAL NURSE SPECIALIST

## 2019-10-10 PROCEDURE — G8427 DOCREV CUR MEDS BY ELIG CLIN: HCPCS | Performed by: CLINICAL NURSE SPECIALIST

## 2019-10-10 PROCEDURE — 1090F PRES/ABSN URINE INCON ASSESS: CPT | Performed by: CLINICAL NURSE SPECIALIST

## 2019-10-10 PROCEDURE — 1123F ACP DISCUSS/DSCN MKR DOCD: CPT | Performed by: CLINICAL NURSE SPECIALIST

## 2020-01-01 ENCOUNTER — LAB REQUISITION (OUTPATIENT)
Dept: LAB | Facility: HOSPITAL | Age: 85
End: 2020-01-01

## 2020-01-01 DIAGNOSIS — Z00.00 ENCOUNTER FOR GENERAL ADULT MEDICAL EXAMINATION WITHOUT ABNORMAL FINDINGS: ICD-10-CM

## 2020-01-01 LAB
ALBUMIN SERPL-MCNC: 3.3 G/DL (ref 3.5–5.2)
ALP SERPL-CCNC: 93 U/L (ref 39–117)
ALT SERPL W P-5'-P-CCNC: 15 U/L (ref 1–33)
ANION GAP SERPL CALCULATED.3IONS-SCNC: 17 MMOL/L (ref 5–15)
AST SERPL-CCNC: 22 U/L (ref 1–32)
BASOPHILS # BLD AUTO: 0.07 10*3/MM3 (ref 0–0.2)
BASOPHILS NFR BLD AUTO: 0.6 % (ref 0–1.5)
BILIRUB CONJ SERPL-MCNC: 0.2 MG/DL (ref 0.2–0.3)
BILIRUB INDIRECT SERPL-MCNC: 0.3 MG/DL
BILIRUB SERPL-MCNC: 0.5 MG/DL (ref 0.2–1.2)
BUN BLD-MCNC: 56 MG/DL (ref 8–23)
BUN/CREAT SERPL: 32 (ref 7–25)
CALCIUM SPEC-SCNC: 9.8 MG/DL (ref 8.6–10.5)
CHLORIDE SERPL-SCNC: 111 MMOL/L (ref 98–107)
CHOLEST SERPL-MCNC: 142 MG/DL (ref 0–200)
CO2 SERPL-SCNC: 26 MMOL/L (ref 22–29)
CREAT BLD-MCNC: 1.75 MG/DL (ref 0.57–1)
DEPRECATED RDW RBC AUTO: 57.2 FL (ref 37–54)
EOSINOPHIL # BLD AUTO: 0.1 10*3/MM3 (ref 0–0.4)
EOSINOPHIL NFR BLD AUTO: 0.9 % (ref 0.3–6.2)
ERYTHROCYTE [DISTWIDTH] IN BLOOD BY AUTOMATED COUNT: 17.2 % (ref 12.3–15.4)
GFR SERPL CREATININE-BSD FRML MDRD: 28 ML/MIN/1.73
GLUCOSE BLD-MCNC: 107 MG/DL (ref 65–99)
HBA1C MFR BLD: 5.5 % (ref 4.8–5.6)
HCT VFR BLD AUTO: 36.2 % (ref 34–46.6)
HDLC SERPL-MCNC: 43 MG/DL (ref 40–60)
HGB BLD-MCNC: 11.1 G/DL (ref 12–15.9)
IMM GRANULOCYTES # BLD AUTO: 0.05 10*3/MM3 (ref 0–0.05)
IMM GRANULOCYTES NFR BLD AUTO: 0.5 % (ref 0–0.5)
LDLC SERPL CALC-MCNC: 73 MG/DL (ref 0–100)
LDLC/HDLC SERPL: 1.69 {RATIO}
LYMPHOCYTES # BLD AUTO: 2.04 10*3/MM3 (ref 0.7–3.1)
LYMPHOCYTES NFR BLD AUTO: 18.7 % (ref 19.6–45.3)
MCH RBC QN AUTO: 28.6 PG (ref 26.6–33)
MCHC RBC AUTO-ENTMCNC: 30.7 G/DL (ref 31.5–35.7)
MCV RBC AUTO: 93.3 FL (ref 79–97)
MONOCYTES # BLD AUTO: 0.99 10*3/MM3 (ref 0.1–0.9)
MONOCYTES NFR BLD AUTO: 9.1 % (ref 5–12)
NEUTROPHILS # BLD AUTO: 7.64 10*3/MM3 (ref 1.7–7)
NEUTROPHILS NFR BLD AUTO: 70.2 % (ref 42.7–76)
NRBC BLD AUTO-RTO: 0 /100 WBC (ref 0–0.2)
PLATELET # BLD AUTO: 332 10*3/MM3 (ref 140–450)
PMV BLD AUTO: 10 FL (ref 6–12)
POTASSIUM BLD-SCNC: 4.8 MMOL/L (ref 3.5–5.2)
PREALB SERPL-MCNC: 8.7 MG/DL (ref 20–40)
PROT SERPL-MCNC: 6.6 G/DL (ref 6–8.5)
RBC # BLD AUTO: 3.88 10*6/MM3 (ref 3.77–5.28)
SODIUM BLD-SCNC: 154 MMOL/L (ref 136–145)
TRIGL SERPL-MCNC: 131 MG/DL (ref 0–150)
TSH SERPL DL<=0.05 MIU/L-ACNC: 4.87 UIU/ML (ref 0.27–4.2)
VIT B12 BLD-MCNC: 519 PG/ML (ref 211–946)
VLDLC SERPL-MCNC: 26.2 MG/DL
WBC NRBC COR # BLD: 10.89 10*3/MM3 (ref 3.4–10.8)

## 2020-01-01 PROCEDURE — 36415 COLL VENOUS BLD VENIPUNCTURE: CPT | Performed by: INTERNAL MEDICINE

## 2020-01-01 PROCEDURE — 85025 COMPLETE CBC W/AUTO DIFF WBC: CPT | Performed by: INTERNAL MEDICINE

## 2020-01-01 PROCEDURE — 80061 LIPID PANEL: CPT | Performed by: INTERNAL MEDICINE

## 2020-01-01 PROCEDURE — 82607 VITAMIN B-12: CPT | Performed by: INTERNAL MEDICINE

## 2020-01-01 PROCEDURE — 80076 HEPATIC FUNCTION PANEL: CPT | Performed by: INTERNAL MEDICINE

## 2020-01-01 PROCEDURE — 80048 BASIC METABOLIC PNL TOTAL CA: CPT | Performed by: INTERNAL MEDICINE

## 2020-01-01 PROCEDURE — 83036 HEMOGLOBIN GLYCOSYLATED A1C: CPT | Performed by: INTERNAL MEDICINE

## 2020-01-01 PROCEDURE — 84134 ASSAY OF PREALBUMIN: CPT | Performed by: INTERNAL MEDICINE

## 2020-01-01 PROCEDURE — 84443 ASSAY THYROID STIM HORMONE: CPT | Performed by: INTERNAL MEDICINE

## 2020-03-25 PROBLEM — M50.10 CERVICAL DISC DISORDER WITH RADICULOPATHY: Status: RESOLVED | Noted: 2017-09-27 | Resolved: 2020-03-24
